# Patient Record
Sex: FEMALE | Race: WHITE
[De-identification: names, ages, dates, MRNs, and addresses within clinical notes are randomized per-mention and may not be internally consistent; named-entity substitution may affect disease eponyms.]

---

## 2017-11-10 NOTE — RAD
LEFT KNEE FOUR VIEWS:

 

HISTORY:

Left knee pain.

 

FINDINGS:

Degenerative changes are present, manifested by osteophyte formation and joint space narrowing in al
l three compartments of the joint space.  No fracture, dislocation, or bony destruction is seen.

 

IMPRESSION:

Left knee osteoarthritis.

 

POS: OFF

## 2017-11-10 NOTE — RAD
RIGHT KNEE 4 VIEWS:

 

HISTORY: 

Right knee pain.

 

FINDINGS/IMPRESSION:  

Degenerative changes are present, most prominent in the lateral tibial femoral compartment.  No acut
e fracture or dislocation is identified.  There is fullness in the suprapatellar space suggestive of
 a joint effusion.

 

POS: OFF

## 2018-06-30 NOTE — PDOC.EVN
Event Note





- Event Note


Event Note: 


Patient with generalized pruritis and rash appr one hour post start of  IV 

vancomycin infusion, no Shortness of breath VSS will d/c Vancomycin and 

consider cleocin as alernative noting multiple prior antibiotic reactions.

## 2018-07-01 NOTE — ULT
LEFT UPPER EXTREMITY VENOUS DUPLEX STUDY:

 

Date:  07/01/18

 

Ultrasound Doppler with spectral analysis and color Doppler performed on the veins of the left upper 
extremity. 

 

INDICATION:

Left upper extremity pain and edema. 

 

FINDINGS:

Internal jugular vein shows normal flow and compression. Left subclavian vein exhibits normal flow wi
th Doppler evaluation. Left axillary vein, basilic vein, cephalic vein, and brachial vein all show no
rmal flow and compression. Ulnar and radial veins below the elbow show normal flow and compression. 

 

In the subcutaneous tissues in the region of the left shoulder/axilla, there is a circumscribed round
ed hypoechoic mass-like structure measuring 1.0 x 1.5 cm, probably a lymph node. 

 

IMPRESSION: 

No evidence of left upper extremity venous thrombosis. 

 

POS: [facility]

## 2018-07-01 NOTE — ULT
BILATERAL LOWER EXTREMITY VENOUS DUPLEX STUDY:

 

Date:  07/01/18 

 

INDICATION:

Bilateral lower extremity pain and edema. 

 

FINDINGS:

Deep veins of both lower extremities evaluated with color Doppler spectral analysis, and compression.
 

 

Veins of both lower extremities show normal compression and blood flow. No evidence of deep venous th
rombosis. 

 

IMPRESSION: 

No evidence of deep venous thrombosis. 

 

 

POS: [facility]

## 2018-07-01 NOTE — HP-2
DATE OF ADMISSION:  06/30/2018

 

TIME:  2217 hours.

 

ATTENDING:  Mendoza Justin MD

 

CODE STATUS:  FULL.

 

PRIMARY CARE PHYSICIAN:  Gracia Caballero MD

 

HISTORIAN:  Patient and daughter.

 

CHIEF COMPLAINT:  Swelling in left arm.

 

HISTORY OF PRESENT ILLNESS:  This is a 78-year-old female who comes in with 
chief complaint of left arm swelling.  She stated that last Thursday, she fell 
and scraped her wrist on cement after a fall, since then she developed erythema 
and swelling that actually started near her elbow and has been spreading ever 
since then to her wrist and upper arms.  She endorsed pain and swelling with 
erythema.  She denies fever, chills, or sweats.  She has not tried any kind of 
medication for this.  She notes a history of recurrent arm cellulitis that was 
treated in the past with antibiotics and the cellulitis always happen in her 
left arm.  She is status post mastectomy with her left breast.

 

PAST MEDICAL HISTORY:

1.  Hypothyroidism secondary to thyroid malignancy, status post resection.

2.  Hypertension.

3.  Heart murmur.

4.  History of DVT and PE.

5.  Normocytic anemia.

6.  Breast cancer, status mastectomy.

7.  Anxiety.

8.  Depression.

 

PAST SURGICAL HISTORY:

1.  Thyroidectomy.

2.  Bilateral mastectomy.

3.  Appendectomy.

4.  Cholecystectomy.

5.  Hysterectomy.

6.  Left shoulder surgery.

7.  Left ankle surgery.

8.  Abdominal hernia repair.

 

FAMILY HISTORY:

1.  Family history of breast cancer.

2.  Hyperlipidemia.

3.  Hypertension.

 

SOCIAL HISTORY:  The patient is a former tobacco user with a 60-pack-year 
history, but has quit more than 10 years ago.  Denies alcohol use or drug use.  
She lives at home with her  and has 3 family members within 1 mile.

 

ALLERGIES:

1.  PHENAZOPYRIDINE.

2.  AMOXICILLIN.

3.  ASPIRIN.

4.  CEPHALEXIN.

5.  PENICILLIN.

6.  SULFA ANTIBIOTICS.

7.  PAPER TAPE.

 

MEDICATIONS:

1.  Sertraline 100 mg p.o. daily.

2.  Losartan 100 mg p.o. daily.

3.  Atorvastatin 20 mg p.o. daily.

4.  Hydrochlorothiazide 12.5 mg p.o. daily.

5.  Eliquis 5 mg p.o. b.i.d.

6.  Levothyroxine 137 mcg p.o. daily.

7.  Tramadol 50 mg q.8 p.r.n.

8.  Multivitamin 1 capsule p.o. daily.

 

REVIEW OF SYSTEMS:  

General:  Denies current fever or chills or night sweats.  

Eyes:  Denies new vision changes.  

ENT:  Denies rhinorrhea or sore throat.  

Respiratory:  Endorses some cough and shortness of breath at times, but not at 
this moment.  

Cardiovascular:  Denies chest pain, palpitation, but endorses a history of 
heart murmur.  

Gastrointestinal:  Denies nausea, vomiting, diarrhea at this time.  
Genitourinary:  Denies dysuria or discharge.  

Skin:  Endorses rash on left arm.  

Musculoskeletal:  Endorses osteoarthritis with back pain and knee pain.  

Neurologic:  Denies any new weakness or numbness.  

Psychiatric:  Endorses anxiety and depression.

 

PHYSICAL EXAMINATION:

VITAL SIGNS:  Blood pressure 104/65, pulse 75, respirations 16, temperature 99.7
, O2 95% on room air.

GENERAL:  Alert, oriented x3, not in acute distress, obese, and appropriately 
interactive.

EYES:  Conjunctivae within normal limits.  Sclerae nonicterus.

ENT:  Nasal mucosa within normal limits.  Oropharynx is moist.

NECK:  Supple, without lymphadenopathy.

CARDIOVASCULAR:  Regular rate and rhythm, 2/6 systolic murmur.

RESPIRATORY:  Normal effort without any retractions.  Clear to auscultation 
bilaterally.

SKIN:  Warm and dry.  Left arm has erythema spreading from the wrist to the 
upper arm.  There is mild pain on palpation and left arm is mildly swollen 
compared to right arm, there is no area of induration or obvious skin break.

ABDOMEN:  Soft, tender throughout.  Bowel sounds present and normoactive.

EXTREMITIES:  2+ pitting edema in lower extremities.  Left leg is obviously 
larger than right leg and there is mild pain to palpation of the left leg.

MUSCULOSKELETAL:  Muscle strength is 4/5 throughout.  Structures within normal 
limits.  Range of motion is diminished bilaterally..

NEUROLOGICAL:  No obvious focal deficits.  Sensation within normal limits.  
cranial nerves II through XII grossly intact.  GCS 15.

PSYCHIATRIC:  Appropriate.

 

LABORATORY DATA:

1.  WBC 10.2, hemoglobin 7.4, platelets 241,000, MCV 79.

2.  Sodium 139, potassium 4, chloride 108, bicarbonate 21, BUN 21, creatinine 
1.2, glucose 114.

3.  Lactic acid 1.4, AST 13, ALT 8, alkaline phosphatase 45.

 

ASSESSMENT AND PLAN:

1.  Cellulitis of left arm.  Consider possibility of embolus as patient has had 
a mastectomy in her left breast and she reportedly has recurrent issue of 
erythema and swelling in her left arm.  We will obtain Doppler to help rule out 
embolus as a possible reason for her recurrent arm swelling.  The patient has 
multiple allergies, so we will start initially on vancomycin and consider 
clindamycin as a secondary choice.  Cultures have been obtained and pending 
sensitivity, will narrow coverage.

2.  Acute kidney injury.  Creatinine is mildly elevated above baseline.  At 
this time, patient will be monitored and will encourage monitor with morning 
labs for kidney function and she will be encouraged to increase p.o. fluid 
intake; if it is inadequate, we will start IV fluids in addition.

3.  Normocytic anemia.  This is a chronic issue which the patient is not 
currently endorsing any symptoms of anemia.  She is seeing Dr. Melo for 
iron infusion and her next iron infusion is not due until next week.

4.  Deep vein thrombosis rule out.  Patient has previous history of deep vein 
thrombosis.  Her left leg is noticeably larger than right leg.  We will obtain 
a Doppler for her left leg.

5.  Hypothyroidism.  We will continue her levothyroxine.

6.  Hyperlipidemia.  Continue home atorvastatin.

7.  Hypertension.  We will continue losartan.

8.  Anxiety and depression.  We will continue patient's sertraline.

9.  Ppx: Lovenox for DVT prevention

 

DISPOSITION AND LENGTH OF HOSPITAL STAY:  The patient will be disposed to 
medical floor.  Length of hospital stay is anticipated to be at least 2 days.

 

This history and physical exam as well as management have been discussed with 
Dr. Justin who agrees.

 

ARIA

## 2018-07-01 NOTE — PDOC.FM
- Subjective


Subjective: 





No acute events overnight. Pt reports she is feeling better and that the pain 

and redness is slightly improved. 





- Objective


Vital Signs & Weight: 


 Vital Signs (12 hours)











  Temp Pulse Resp BP Pulse Ox


 


 07/01/18 08:13  97.7 F  66  18  109/67  99











Result Diagrams: 


 07/01/18 04:46





 07/01/18 04:46





<Brady Bonner - Last Filed: 07/01/18 10:06>





- Objective


Vital Signs & Weight: 


 Vital Signs (12 hours)











  Temp Pulse Pulse Resp BP BP Pulse Ox


 


 07/01/18 10:11  97.8 F   63  18  116/71   96


 


 07/01/18 08:13  97.7 F  66   18   109/67  99


 


 07/01/18 08:00  97.7 F  66   18   











I&O: 


 











 06/30/18 07/01/18 07/02/18





 06:59 06:59 06:59


 


Intake Total   180


 


Balance   180











Result Diagrams: 


 07/01/18 04:46





 07/01/18 04:46





<Mendoza Justin - Last Filed: 07/01/18 11:52>





Phys Exam





- Physical Examination


Constitutional: NAD


HEENT: PERRLA, sclera anicteric


Neck: no nodes, no JVD


Respiratory: no wheezing, no rales, no rhonchi, clear to auscultation bilateral


Cardiovascular: RRR, no rub


murmuer 2/6


Gastrointestinal: soft, non-tender, no distention, positive bowel sounds


Musculoskeletal: pulses present, edema present


LUE and LLE edema, chronic 


Neurological: non-focal, normal sensation, moves all 4 limbs


Skin: cap refill <2 seconds


Deviation from normal: erythematous LUE extending from inferior to elbow 

superiorly to shoulder





<Brady Bonner - Last Filed: 07/01/18 10:06>





Dx/Plan


(1) Cellulitis


Code(s): L03.90 - CELLULITIS, UNSPECIFIED   Status: Acute   





(2) Normocytic anemia


Code(s): D64.9 - ANEMIA, UNSPECIFIED   Status: Acute   





(3) HTN (hypertension)


Code(s): I10 - ESSENTIAL (PRIMARY) HYPERTENSION   Status: Acute   





(4) HLD (hyperlipidemia)


Code(s): E78.5 - HYPERLIPIDEMIA, UNSPECIFIED   Status: Acute   





(5) Anxiety


Code(s): F41.9 - ANXIETY DISORDER, UNSPECIFIED   Status: Acute   





(6) History of DVT (deep vein thrombosis)


Code(s): Z86.718 - PERSONAL HISTORY OF OTHER VENOUS THROMBOSIS AND EMBOLISM   

Status: Acute   





- Plan


Plan: 





1) cellulitis: cont IV abx for now, reports slight improvement form yesterday


- nurse to susie area of erythema


- monitor


- s/p b/l mastectomy with radical llymph node dissection likely componenet of 

lymphedema, recommend compression device when cellulitis improves





2) Normocytic anemia hgb of 6


- pt typed and crossed and will transfuse 1 U PRBC, rechec h/h after transfusion


- cont lovenox for now pending DVT/doppler studies and dc if negative for dvt


- pt follows hematology op and has been evaluated extensively


- was scheduled for iron infusion tomorrow w/ Dr Melo





3) HTN: home meds





4) HLD: home meds





5) Anxiety/depression: home meds





6) h/o DVT:


- dopplers pending of LLE and LUE


- DVT unlikely; awaiting doppler studies





Dispo: Pt likely stable for DC to home with PO clinda tomorrow if erythema 

improves








<Brady Bonner - Last Filed: 07/01/18 10:06>





Attending Addendum





- Attending Addendum


Date/Time: 07/01/18 1142





I personally evaluated the patient and discussed the management with Dr. Bonner


I agree with the History, Examination, Assessment and Plan documented above 

with any addition or exceptions noted below.Patient redness improved since 

admission . Note history of undetermined source of GI bleed extensively 

evaluated in the past  including capsule video endoscopy. Patient has been 

receiving weekly IV iron infusions. Note drop in H/H will administer blood 

products today and discontinue lovenox if DVT ruled out. Note with history of 

lumpectomy bilateral breast and left axillary lymph node dissection with 

lymphedema patient compression garment would be prudent however Patient has 

tried compression garments in the past but states she is sensitive to spandex 

and did not tolerate them.








<Mendoza Justin - Last Filed: 07/01/18 11:52>

## 2018-07-02 NOTE — CON
DATE OF CONSULTATION:  07/02/2018

 

REASON FOR CONSULTATION:  Anemia.

 

HISTORY OF PRESENT ILLNESS:  Ms. Coates is a pleasant 78-year-old  female with a history of
 deep venous thrombosis and pulmonary embolus on anticoagulation with Eliquis.  She has had chronic i
michael deficiency since starting Eliquis in 2015.  She receives IV iron transfusions in the outpatient s
etting.  She has had multiple episodes where she would have a drop in hemoglobin as low as 5.9.  She 
has been evaluated by GI with endoscopy in 2015 and underwent capsule endoscopy in 2017 and all negat
torito.  I felt that her anemia was likely from a chronic gastrointestinal bleed from anticoagulation.  
She has a sedentary lifestyle and rarely walks.  Decision was made by Dr. Jackson, Dr. Delong, and MARIANNA kendall to continue Eliquis due to her high risk for repeat DVT and pulmonary embolus.  She presented to
 the emergency room on this visit with left arm swelling and erythema.  She was diagnosed with cellul
itis and started on antibiotics with improvement in her cellulitis.  During this admission, her hemog
lobin has dropped to 6.  She has received 2 units of packed RBCs.  Her last iron infusion was last we
ek in our clinic.  On exam, she has no shortness of breath or fatigue.  She does complain of abdomina
l pain in the lower right quadrant.  She has a history of hysterectomy.  Denies any blood in her stoo
l or vaginal bleeding.

 

PAST MEDICAL HISTORY:

1.  Left breast cancer in 1990.

2.  Thyroid cancer in 1960.

3.  Hypertension.

4.  Hyperlipidemia.

5.  Peptic ulcer disease.

6.  Hypothyroidism.

7.  Osteoarthritis.

8.  Obesity.

9.  Provoked thrombosis and pulmonary embolism in 2015.

 

PAST SURGICAL HISTORY:

1.  Appendectomy.

2.  Partial thyroidectomy.

3.  Cholecystectomy.

4.  Ventral hernia repair.

5.  Partial mastectomy.

6.  Ankle and fracture repair.

 

ALLERGIES:  CODEINE, PAPER TAPE, PENICILLIN, SULFA.

 

HOME MEDICATIONS:

1.  Eliquis 5 mg b.i.d.

2.  Lipitor 20 mg daily.

3.  Hydrochlorothiazide 12.5 mg daily.

4.  Levothyroxine 135 mcg daily.

5.  Losartan 100 mg daily.

6.  Multivitamin daily.

7.  Sertraline 100 mg daily.

8.  Tramadol 50 mg p.r.n.

 

FAMILY HISTORY:  Diabetes.  Father had lung cancer.  Brother with pancreatic cancer, both as a child 
with vitamin D deficiency.

 

SOCIAL HISTORY:  , lives with her spouse, 4 grown children. No alcohol, tobacco or illicit castillo
g use.

 

REVIEW OF SYSTEMS:  Twelve-point review of systems is negative except for noted in HPI.

 

PHYSICAL EXAMINATION:

VITAL SIGNS:  Temperature 97.9, pulse is 61, respiratory rate 16, BP is 145/64, she is 97% on room ai
r.

GENERAL:  This is a morbidly obese female in no acute distress.

HEENT:  Normocephalic, atraumatic.  Pupils equal and reactive to light.

NECK:  Supple.

CARDIOVASCULAR:  Regular rate and rhythm.

LUNGS:  Clear.

ABDOMEN:  Obese, mildly tender to palpation in her right lower quadrant.

EXTREMITIES:  No clubbing.  She has a left upper arm swelling.

SKIN:  No rash.

HEMATOLOGIC:  No petechia or purpura.

NEUROLOGIC:  Nonfocal.

PSYCHIATRIC:  The patient is alert and oriented and appropriate.

 

PERTINENT LABORATORY AND IMAGING DATA:  Current WBCs are 7, hemoglobin 6.9, hematocrit 22.4, platelet
 count is 179,000, 68% neutrophils, 18% lymphocytes, 11% monocytes.  PT is 15.2, INR is 1.2, PTT 31.7
.  Sodium 142, potassium 4.0, chloride 111, CO2 is 24, BUN is 20, creatinine 0.81, calcium is 8.6, la
st ferritin was 9.1, total bilirubin is 0.4, AST is 9, ALT is 8, alkaline phosphatase is 38, serum to
lexis protein 5.6, albumin 3.4, globulin 2.2.  Urine was negative for bacteria.

 

ASSESSMENT:

1.  Acute on chronic anemia.

2.  Left upper extremity cellulitis.

3.  Right lower quadrant abdominal pain.

 

DISCUSSION:  The patient has had a negative GI workup including endoscopy in 2015 and capsule endosco
py in 2017.  She has remained on Eliquis due to her sedentary lifestyle and high risk for recurrent D
VT and PE.  Cedar is that this is chronic anemia from gastrointestinal bleeding.  She does have 
a new abdominal discomfort.  We will check CT scan to rule out any type of malignancy, any history of
 breast cancer, although she has received 2 units of packed RBCs.  No iron is needed at this time.  S
he may be a candidate for an IVC filter in order to stop anticoagulation as she has had multiple epis
odes where hemoglobin has been around 6.  We will discuss further with Dr. Melo.

 

Thank you for the consult.  We will follow her closely.

## 2018-07-02 NOTE — CT
CT ABDOMEN AND PELVIS WITH IV CONTRAST:

 

HISTORY: 

Breast cancer, abdominal pain, chronic UTI.

 

FINDINGS: 

Comparison is made with the exam of 4/27/15.

 

There are mild dependent changes in the lung bases.  Moderate-sized hiatal hernia is again seen.  The
 2.5 cm cyst in the left lobe of the liver is essentially stable.  The 2.5 cm right adrenal nodule an
d the 2.2 cm left adrenal nodule are stable, consistent with adenomas.  The spleen and pancreas are n
ormal.  Small low-density lesions in the kidneys likely cysts are seen bilaterally.

 

No free air, free fluid, or lymphadenopathy is noted in the abdomen or pelvis.

 

The patient is post cholecystectomy and appendectomy and hysterectomy.  There are vascular calcificat
ions without evidence of aneurysmal dilatation of the abdominal aorta.  There are degenerative change
s in the spine.  The small bowel loops are not abnormally dilated.  There is colonic diverticulosis w
ithout definite colon diverticulitis.  A small fat-containing ventral hernia is present.

 

IMPRESSION: 

1.  Colonic diverticulosis without evidence of diverticulitis.

 

2.  Bilateral adrenal adenomas.

 

3.  Moderate hiatal hernia.

 

4.  Stable liver and renal cysts.

 

POS: [facility]

## 2018-07-02 NOTE — PDOC.FM
- Subjective


Subjective: 


Pt reports feeling well after rest. Subjective fevers (chronic) no chills, no 

pain in L arm. No nausea/vommiting. Complains of itching on back of neck, 

reports that at home she resolves itching with hydrocortisone cream.





H/H- 6.9/22.4, Daughter reports that on 6/15, her hb was 7.7





ROS:


Gen: fevers, no chills


HEENT: no vision changes, no hearing changes


CARD: no CP, no palpitations


PULM: no SOB, no cough


GI: no N, no V


: no hematuria, no dysuria


Extr: mild swelling in L arm, no pain


Neuro: no syncope, no seizures





- Objective


MAR Reviewed: Yes


Vital Signs & Weight: 


 Vital Signs (12 hours)











  Temp Pulse Resp Pulse Ox


 


 07/01/18 20:00  98 F  66  20  96











I&O: 


 











 06/30/18 07/01/18 07/02/18





 06:59 06:59 06:59


 


Intake Total   540


 


Balance   540











Result Diagrams: 


 07/02/18 03:54





 07/02/18 03:54





<Kirill Ramirez - Last Filed: 07/02/18 11:31>





- Objective


Vital Signs & Weight: 


 Vital Signs (12 hours)











  Temp Pulse Resp BP BP Pulse Ox


 


 07/02/18 15:46  97.9 F  61  16  145/64 H   91 L


 


 07/02/18 08:00  97.6 F  60  20    97


 


 07/02/18 07:28  97.6 F  60  20   152/84 H  97











I&O: 


 











 07/01/18 07/02/18 07/03/18





 06:59 06:59 06:59


 


Intake Total  540 700


 


Balance  540 700











Result Diagrams: 


 07/02/18 03:54





 07/02/18 03:54





<Cha Canchola - Last Filed: 07/02/18 17:23>





Phys Exam





- Physical Examination


Constitutional: NAD


HEENT: moist MMs, sclera anicteric


Neck: full ROM


Respiratory: no wheezing, clear to auscultation bilateral


Cardiovascular: RRR, no significant murmur


Gastrointestinal: soft, non-tender, positive bowel sounds


Musculoskeletal: edema present (L upper extremity- mild non-pitting edema. 

hematoma on L hand from previous inury. Mild erythema on posterior L forearm 

measuring approximately 7ide4ny)


Psychiatric: normal affect





<Kirill Ramirez - Last Filed: 07/02/18 11:31>





Dx/Plan


(1) Anxiety


Code(s): F41.9 - ANXIETY DISORDER, UNSPECIFIED   Status: Acute   





(2) Cellulitis


Code(s): L03.90 - CELLULITIS, UNSPECIFIED   Status: Acute   


  QualifierTitle:    Site of cellulitis: extremity   Site of cellulitis of 

extremity: upper extremity   Laterality: left   Qualified Code(s): L03.114 - 

Cellulitis of left upper limb   





(3) HLD (hyperlipidemia)


Code(s): E78.5 - HYPERLIPIDEMIA, UNSPECIFIED   Status: Chronic   





(4) HTN (hypertension)


Code(s): I10 - ESSENTIAL (PRIMARY) HYPERTENSION   Status: Chronic   





(5) History of DVT (deep vein thrombosis)


Code(s): Z86.718 - PERSONAL HISTORY OF OTHER VENOUS THROMBOSIS AND EMBOLISM   

Status: Acute   





(6) Normocytic anemia


Code(s): D64.9 - ANEMIA, UNSPECIFIED   Status: Chronic   





(7) Anemia, iron deficiency


Code(s): D50.9 - IRON DEFICIENCY ANEMIA, UNSPECIFIED   Status: Chronic   





- Plan


Plan: 





1) cellulitis: cont IV abx, daughter reports great improvement over night


- monitor


- continue PO clindamycin





2) Normocytic anemia hgb of 6.9 (6 -> 7.1 -> 6.9 s/p 1 U PRBC)


- pt follows hematology op and has been evaluated extensively


- was scheduled for iron infusion today w/ Dr Melo


-consult Hematology, likely will give 1 U PRBC





3) HTN: home meds





4) HLD: home meds





5) Anxiety/depression: home meds





6) h/o DVT:


- dopplers of LLE and LUE negative





Dispo: Pt likely stable for DC to home with PO clinda tomorrow pending improved 

H/H








<Kirill Ramirez - Last Filed: 07/02/18 11:31>





Attending Addendum





- Attending Addendum


Date/Time: 07/02/18 9934





I personally evaluated the patient and discussed the management with Dr. Ramirez.


I agree with the History, Examination, Assessment and Plan documented above 

with any addition or exceptions noted below.


The patient's cellulitis is improving with clindamycin.  She remains anemic 

after 1st transfusion with hb < 7 this morning.  Will plan to transfuse today 

and consult heme.





<Cha Canchola - Last Filed: 07/02/18 17:23>

## 2018-07-03 NOTE — PDOC.FM
- Subjective


Subjective: 


No acute events overnight. Pt reports good rest and resolution of warmth in arm.





Ros:


Gen: no fever, no chills


CARD: no cp, no palpitations


Pulm: GUZMAN (chronic), no cough


GI: no N, no V


: no hematuria, no dysuria





- Objective


MAR Reviewed: Yes


Vital Signs & Weight: 


 Vital Signs (12 hours)











  Temp Pulse Resp BP Pulse Ox


 


 07/02/18 20:14  98.0 F  62  18  143/80 H  96


 


 07/02/18 20:00  98.0 F  62  18   96








 Weight











Weight                         131.27 kg














I&O: 


 











 07/01/18 07/02/18 07/03/18





 06:59 06:59 06:59


 


Intake Total  540 700


 


Balance  540 700











Result Diagrams: 


 07/03/18 04:29





 07/03/18 04:29





<Kirill Ramirez - Last Filed: 07/03/18 08:30>





- Objective


Vital Signs & Weight: 


 Vital Signs (12 hours)











  Temp Pulse Pulse Resp BP BP Pulse Ox


 


 07/03/18 09:14    69   129/77  


 


 07/03/18 08:00  98.1 F  58 L   20   123/78  97














  Pulse Ox


 


 07/03/18 09:14  95


 


 07/03/18 08:00 








 Weight











Weight                         131.27 kg














I&O: 


 











 07/02/18 07/03/18 07/04/18





 06:59 06:59 06:59


 


Intake Total 540 1140 600


 


Balance 540 1140 600











Result Diagrams: 


 07/03/18 04:29





 07/03/18 04:29





<Cha Canchola - Last Filed: 07/03/18 18:00>





Phys Exam





- Physical Examination


HEENT: moist MMs, sclera anicteric


Respiratory: no wheezing, clear to auscultation bilateral


Cardiovascular: RRR, no rub


Gastrointestinal: soft


tenderness to palpation- LLQ


Musculoskeletal: pulses present, edema present (mild edema on LUE, no erythema)


Psychiatric: normal affect


Skin: normal turgor, cap refill <2 seconds





<Kirill Ramirez - Last Filed: 07/03/18 08:30>





Dx/Plan


(1) Anxiety


Code(s): F41.9 - ANXIETY DISORDER, UNSPECIFIED   Status: Acute   





(2) Cellulitis


Code(s): L03.90 - CELLULITIS, UNSPECIFIED   Status: Acute   


  QualifierTitle:    Site of cellulitis: extremity   Site of cellulitis of 

extremity: upper extremity   Laterality: left   Qualified Code(s): L03.114 - 

Cellulitis of left upper limb   





(3) HLD (hyperlipidemia)


Code(s): E78.5 - HYPERLIPIDEMIA, UNSPECIFIED   Status: Chronic   





(4) HTN (hypertension)


Code(s): I10 - ESSENTIAL (PRIMARY) HYPERTENSION   Status: Chronic   





(5) History of DVT (deep vein thrombosis)


Code(s): Z86.718 - PERSONAL HISTORY OF OTHER VENOUS THROMBOSIS AND EMBOLISM   

Status: Acute   





(6) Normocytic anemia


Code(s): D64.9 - ANEMIA, UNSPECIFIED   Status: Chronic   





(7) Anemia, iron deficiency


Code(s): D50.9 - IRON DEFICIENCY ANEMIA, UNSPECIFIED   Status: Chronic   





- Plan


Plan: 





1) cellulitis: improved, no erythema, slight edema, no pain


- switch from IV to PO clindamycin (day 3 of 7)





2) Normocytic anemia hgb of 6.9 (7.1 -> 6.9 -> 7.7 s/p 2 U PRBC)


- pt follows hematology op and has been evaluated extensively


- Hematology: suspects GI slow bleed


- f/u with Heme outpatient





3) HTN: home meds





4) HLD: home meds





5) Anxiety/depression: home meds





6) h/o DVT:


- dopplers of LLE and LUE negative





Dispo: Discharge today with PO clindamycin





<Kirill Ramirez - Last Filed: 07/03/18 08:30>





Attending Addendum





- Attending Addendum


Date/Time: 07/03/18 2815





I personally evaluated the patient and discussed the management with Dr. Ramirez.


I agree with the History, Examination, Assessment and Plan documented above 

with any addition or exceptions noted below.


The patient's cellulitis continues to improve.  Will d/c on po clindamycin.  Hb 

improved after transfusion.  Will f/u with heme as outpt.





<Cha Canchola - Last Filed: 07/03/18 18:00>

## 2018-07-10 NOTE — DIS-2
DATE OF ADMISSION:  06/30/2018

 

DATE OF DISCHARGE:  07/03/2018

 

RESIDENT:  Kirill Ramirez M.D.

 

ADMITTING ATTENDING:  Dr. Mendoza Justin.

 

DISCHARGE ATTENDING:  Dr. Cha Canchola.

 

CONSULTATIONS:  Hematology with Laila Golden.

 

PROCEDURES:  Avascular ultrasound on 07/01/2018 which showed the left upper extremity, no evidence of
 left upper extremity venous thrombosis and another procedure venogram on 07/01/2018 which showed nikolay
ateral lower extremities, no evidence of deep venous thrombosis and an abdominal and pelvis CT on 07/
02/2018 which showed:

1.  Colonic diverticulosis without evidence of diverticulitis.

2.  Bilateral adrenal adenomas.

3.  Moderate hiatal hernia.

4.  Stable liver and renal cysts.

 

PRIMARY DIAGNOSES:  Left arm cellulitis and normocytic anemia likely secondary to gastrointestinal bl
eed.

 

SECONDARY DIAGNOSES:  Hyperlipidemia, hypertension, anxiety and depression, and acute kidney injury.

 

DISCHARGE MEDICATIONS:  Sertraline HCL 50 mg 2 tablets p.o. daily, losartan 100 mg p.o. daily, levoth
yroxine sodium 137 mcg p.o. daily, hydrochlorothiazide 12.5 mg p.o. daily, multivitamin 1 tab p.o. da
kip, atorvastatin, calcium 20 mg p.o. daily, apixaban 5 mg p.o. b.i.d., Tramadol HCL 50 mg p.o. q.8 h
ours p.r.n., clindamycin 300 mg p.o. q.6 hours.

 

DISCONTINUED MEDICATIONS:  Zofran 4 mg p.o. q.6 h. p.r.n. for nausea and vomiting.

 

HOSPITAL COURSE:  Eryn pinon is a 78-year-old female with medical history of bilateral mastecto
my with radical lymph node dissection, presenting to the hospital on 06/30/2018 with left arm swellin
g and was diagnosed with left arm cellulitis.  She was found to have normocytic anemia with hemoglobi
n of 7.4 and hematocrit of 24.7, cellulitis was treated with clindamycin and infection resolved.  Hem
oglobin and hematocrit on 07/01/2018 was found to be 6 and 20, patient received 2 units of packed red
 blood cells over the course of hospital stay and Hematology was consulted.  Patient was discharged a
fter marked improvement of left upper extremity cellulitis and hemoglobin and hematocrit were 7.7 and
 25.1 on discharge.  The patient is to follow up with primary care provider and Hematology, Dr. Lori marie.

 

DISPOSITION:  Stable.

 

DISCHARGE INSTRUCTIONS:  Discharged home with home health.

 

DIET:  Healthy heart diet.

 

ACTIVITY:  As tolerated.

FOLLOWUP:  With PCP within 1-2 weeks at Dr. Caballero and with Hematology/Oncology, Dr. Melo within
 1-2 weeks.

## 2018-09-12 NOTE — HP
PRIMARY CARE PHYSICIAN:  Dr. Samanta Crenshaw

 

CODE STATUS:  FULL CODE.

 

TIME OF EVALUATION:  12:40 a.m.

 

CHIEF COMPLAINT:  Rectal bleeding.

 

HISTORY OF PRESENT ILLNESS:  This is a 78-year-old female patient with past 
medical history of being bedbound, also history of multiple PEs and DVTs.  The 
patient has recurrent episode of bleeding in the past with dropping hemoglobin 
down to 5; however, the bleeding in the past was not evident and was suspected 
to be from a GI source.  The patient has been seeing Dr. Zhang as outpatient and 
there was a scheduled GI procedure, likely a colonoscopy for today 09/12/2018.  
The patient reported that yesterday she had a large amount of blood when she 
was trying to go to the bathroom, and the toilet has a significant amount of 
blood with some clots, she reported that she had hemorrhoids.  Some of them 
have gotten surgery in the past; however, for that reason she came to the ER.  
She was seen, she was evaluated and sent back home, but then she continued to 
have bleeding at home.  Of note, the patient has been on Eliquis for history of 
recurrent PEs.  This placed her at risk for significant bleeding.  For that 
reason, the patient has been placed in the hospital.  The patient did report 
having shortness of breath and feeling tired and for that reason we will give 
her 1 PRBC, we will keep the rest on hold, if patient has any severe bleeding, 
we will transfuse as needed.

 

REVIEW OF SYSTEMS:

CONSTITUTIONAL:  No fever or chills or generalized weakness.  

RESPIRATORY:  No cough, sputum production or shortness of breath.  

CARDIOVASCULAR:  No chest pain or palpitations.  

GASTROINTESTINAL:  The patient has nausea, vomiting.  The patient reported 
having a large amount of red blood per rectum.

CNS:  The patient reported dizziness.  No headache.  Not feeling lightheaded.  

GENITOURINARY:  No burning with urination.  

EXTREMITIES:  No leg swelling.  

 

All other systems were reviewed and are negative except for that mentioned 
above. 

 

PAST MEDICAL HISTORY:  Patient has a history of hyperlipidemia, breast cancer, 
hyperthyroidism, history of heart murmur, history of multiple DVTs and PEs, 
history of anemia, diverticulitis.

 

PAST SURGICAL HISTORY:  Thyroidectomy, bilateral mastectomy, appendectomy, 
cholecystectomy, hysterectomy, left shoulder and left ankle broken needing 
surgery, skin cancer removal.

 

PSYCHIATRIC HISTORY:  Anxiety, depression.



FAMILY HISTORY: Reviewed and non contributory for current presentation. 

 

SOCIAL HISTORY:  Lives at home with family.  The patient is a former tobacco 
user.  The patient quit smoking more than 10 years ago.  The patient denies 
alcohol, no drugs.

 

ALLERGIES:  AMOXICILLIN, CEPHALEXIN, NSAIDs, PENICILLIN, PHENAZOPYRIDINE, SULFA.

 

REPORTED MEDICATIONS:  Sertraline, losartan, atorvastatin, hydrochlorothiazide, 
Eliquis, levothyroxine, tramadol, multivitamin, Anusol-HC 1.

 

PHYSICAL EXAMINATION:

VITAL SIGNS:  On presentation, blood pressure 104/70 with heart rate 71, 
respiratory rate was 18, temperature 99.1, oxygen saturation 95 on room air.

GENERAL APPEARANCE:  The patient is alert, oriented, not in any acute distress.

HEENT:  Eyes; normal conjunctivae.  Moist oral mucosa.  Anicteric.

NECK:  No JVD.

RESPIRATORY:  Bilateral air entry.  No rales, no wheezing.  Symmetric expansion.

CARDIOVASCULAR:  Normal rate, regular rhythm.  The patient has systolic murmur 
in the aortic valve.  No gallop.  No edema.

ABDOMEN:  Soft, normal bowel sounds.

MUSCULOSKELETAL:  Baseline range of motion, normal strength, no tenderness.  
The patient is bedbound.

SKIN:  Warm and intact.  No pallor, no rash or redness.  Peripheral pulses are 
present.  Capillary refill seems to be intact.

NEUROLOGIC:  Intact sensory.  No evidence of any new focal weakness.  Baseline 
speech.  Cranial nerves seems to be intact.

PSYCHIATRIC:  The patient is good mood.  No anxiety.  Oriented, optimal 
judgment.  

 

EKG was discussed with the performing physician from the ER, the patient had 
normal sinus rhythm with the rate of 66 with a QT prolongation of 526.  LVH ____
.  The patient's finding seems to be different when compared with previous one.

 

LABORATORY DATA:  Labs were reviewed.  The patient had a white count of 11 with 
hemoglobin 7.9.  Repeat hemoglobin 7.3, on previous admission was 8.8, MCV 84, 
platelet count 229.  The chemistry on this patient was sodium 139 with 
potassium 4.2, chloride 109, carbon dioxide 23, anion gap 11, BUN 31, 
creatinine initial presentation was 1.2, the repeat 1.1, GFR 40 and 48, glucose 
112, calcium 9.0, magnesium 2.4.  LFTs are normal.

 

ASSESSMENT AND PLAN:  The patient will be placed in the hospital with the 
following medical problems:

1.  Acute gastrointestinal bleeding, seems to be lower, seems to be rectal, 
could be secondary to hemorrhoids, the patient had ____ compensation from 
bleeding since the patient is on Eliquis for a history of recurrent PEs and 
DVTs.  We will place the patient in IMCU for close monitoring.  If the patient 
continues to bleed, we will hydrate aggressively to give hemodynamics and will 
transfuse.  Last hemoglobin 7.3.  The patient is symptomatic with reported 
dizziness and severe generalized weakness.  We will transfuse 1 unit, we will 
repeat hemoglobin, we will monitor.  GI has been consulted and as noted the 
patient had a schedule GI procedure today and we will call Dr. Zhang.  We will 
follow recommendations.  Keep n.p.o. for now.

2.  Uncontrolled hypertension.  Systolic has been 149, will reconcile home meds
, we will not treat aggressively given risk for hypovolemic shock.

3.  Hyperlipidemia, low cholesterol diet is advised, reconcile home meds.

4.  Pulmonary embolus and deep venous thrombosis prophylaxis, the bleeding 
needs to be fixed first.  If cleared by GI we will restart anticoagulation.  
The last dose of Eliquis reportedly was 3 hours ago.

5.  History of diverticulitis, diverticular bleeding is another possibility.  
We will follow GI recommendations.

 

MTDD

## 2018-09-12 NOTE — PDOC.PN
- Subjective


Encounter Start Date: 09/12/18


Encounter Start Time: 11:30


Subjective: pt up in bed no complains





- Objective


Resuscitation Status: 


 











Resuscitation Status           FULL:Full Resuscitation














Vital Signs & Weight: 


 Vital Signs (12 hours)











  Temp Pulse Pulse Resp BP BP Pulse Ox


 


 09/12/18 12:40  97.6 F  62   20   136/38 L 


 


 09/12/18 08:29  98.1 F  60   18   125/37 L  96


 


 09/12/18 07:56  98.2 F   62  18  112/48 L   100


 


 09/12/18 07:50  98.1 F  60   18    100


 


 09/12/18 05:46  97.9 F   64  20  119/41 L  


 


 09/12/18 05:28  97 F L   67  20  97/43 L  


 


 09/12/18 04:24  97.6 F  64   19   129/56 L  95








 Weight











Weight                         270 lb 14.4 oz














I&O: 


 











 09/11/18 09/12/18 09/13/18





 06:59 06:59 06:59


 


Intake Total  0 350


 


Balance  0 350











Result Diagrams: 


 09/12/18 09:20





 09/12/18 03:14





Phys Exam





- Physical Examination


Neck: no nodes, no JVD, supple, full ROM


Respiratory: no wheezing, no rales, no rhonchi, wheezing present, clear to 

auscultation bilateral


Cardiovascular: RRR, no significant murmur, no rub, gallop, irregular





Dx/Plan


(1) GI bleed


Code(s): K92.2 - GASTROINTESTINAL HEMORRHAGE, UNSPECIFIED   Status: Acute   





(2) Anemia


Code(s): D64.9 - ANEMIA, UNSPECIFIED   Status: Acute   





(3) HTN (hypertension)


Code(s): I10 - ESSENTIAL (PRIMARY) HYPERTENSION   Status: Chronic   





(4) Pulmonary embolism


Code(s): I26.99 - OTHER PULMONARY EMBOLISM WITHOUT ACUTE COR PULMONALE   Status

: Acute   





- Plan


will continue to monitor 


-: s/p one unit prbc


-: surgery consulted 


-: will hold bp meds





* .








Review of Systems





- Review of Systems


Respiratory: negative: Cough, Dry, Shortness of Breath, Hemoptysis, SOB with 

Excertion, Pleuritic Pain, Sputum, Wheezing


Cardiovascular: negative: chest pain, palpitations, orthopnea, paroxysmal 

nocturnal dyspnea, edema, light headedness, other


Gastrointestinal: negative: Nausea, Vomiting, Abdominal Pain, Diarrhea, 

Constipation, Melena, Hematochezia, Other


Genitourinary: negative: Dysuria, Frequency, Incontinence, Hematuria, Retention

, Other





- Medications/Allergies


Allergies/Adverse Reactions: 


 Allergies











Allergy/AdvReac Type Severity Reaction Status Date / Time


 


phenazopyridine HCl Allergy Severe Rash Verified 09/12/18 02:47





[From Azo]     


 


amoxicillin Allergy  Nausea Verified 09/12/18 02:47


 


cephalexin monohydrate Allergy  Rash Verified 09/12/18 02:47





[From Keflex]     


 


Penicillins Allergy  Headache Verified 09/12/18 02:47


 


Sulfa (Sulfonamide Allergy  Rash Verified 09/12/18 02:47





Antibiotics)     


 


aspirin AdvReac   Verified 09/12/18 02:47


 


paper tape Allergy  Rash Uncoded 09/12/18 02:47











Medications: 


 Current Medications





Acetaminophen (Tylenol)  650 mg PO Q4H PRN


   PRN Reason: Headache/Fever or Pain


Atorvastatin Calcium (Lipitor)  20 mg PO DAILY [facility]


Hydrocortisone Acetate (Anusol-Hc)  25 mg TX BID PRN


   PRN Reason: Hemorrhoids


Levothyroxine Sodium (Synthroid)  112 mcg PO 0600 [facility]


Levothyroxine Sodium (Synthroid)  25 mcg PO 0600 [facility]


Multivitamins (Theragran)  1 tab PO DAILY [facility]


Ondansetron HCl (Zofran)  4 mg IVP Q6H PRN


   PRN Reason: Nausea/Vomiting


Pantoprazole Sodium (Protonix)  40 mg IVP DAILY [facility]


   Last Admin: 09/12/18 15:05 Dose:  40 mg


Sertraline HCl (Zoloft)  100 mg PO DAILY [facility]


Sodium Chloride (Flush - Normal Saline)  10 ml IVF PRN PRN


   PRN Reason: Saline Flush


Tramadol HCl (Ultram)  50 mg PO Q8HR PRN


   PRN Reason: .MODERATE Pain

## 2018-09-13 NOTE — PRG
DATE OF SERVICE:  09/13/2018

 

SUBJECTIVE:  Ms. Coates is without complaints today.  She has had no bleeding.  Dr. Zimmerman saw her an
d there are plans for a stapled hemorrhoidectomy.

 

OBJECTIVE:

VITAL SIGNS:  Temperature is 98, pulse 66, blood pressure 146/47.

ABDOMEN:  Soft.

 

LABORATORY DATA:  Hemoglobin is 8.5.

 

ASSESSMENT:

1.  Hemorrhoidal bleeding.  I do not think this is any relationship to her problems of chronic anemia
 over the past year.  She has really not had rectal bleeding before.  As noted in initial consultatio
n, she has been previously evaluated with EGD, colonoscopy, and capsule endoscopy of small bowel.

2.  Anemia, stable.

 

RECOMMENDATIONS:  I agree with surgical plans for hemorrhoidectomy.  We will follow from a distance. 
 If I can be of any further assistance, please do not hesitate to contact me.

## 2018-09-13 NOTE — PRG
DATE OF SERVICE:  09/13/2018

 

SUBJECTIVE:  The patient is doing well.  No further bleeding.  No pain.  

 

PHYSICAL EXAMINATION: 

GENERAL:  Morbidly obese.

VITAL SIGNS:  Temperature 98, pulse 56, blood pressure 148/48.  

 

She looks good.  No bleeding.  Her abdomen is soft, nondistended, nontender.  

 

H&H stable at 7.9 and 24.

 

PLAN:  Try and do a bowel prep, so that we can safely do a stapled hemorrhoidopexy in the morning.

## 2018-09-13 NOTE — PRG
DATE OF SERVICE:  09/13/2018

 

SUBJECTIVE:  The patient is doing well.  She has had no bleeding since last night since we put the Ge
lfoam in.  Her H and H are stable.  She is having no pain.

 

PHYSICAL EXAMINATION:

VITAL SIGNS:  Temperature 98.8, pulse 61, blood pressure is 132/40.

GENERAL:  She looks good.  She is in no distress.  Her color is okay.

ABDOMEN:  Soft, nondistended.

 

LABORATORY DATA:  Her H and H are 7.9 and 24.  She does have blood available.

 

PLAN:  To start her on clear liquids, try and give her a bowel prep today and then do hemorrhoidectom
y tomorrow.

## 2018-09-13 NOTE — PRG
DATE OF SERVICE:  09/13/2018

 

SERVICE:  Pulmonary Medicine.

 

INTERVAL HISTORY:  The patient is doing fine from a respiratory standpoint.  Denies any current chest
 pain, nausea, vomiting, fevers or chills.  She is breathing comfortably.  Her hemoglobins remained s
table overnight.  She did not receive any more units of blood.  Otherwise, there has been no interval
 change to her condition.  Abdominal discomfort is not present.  She has a very good appetite.

 

PHYSICAL EXAMINATION:

VITAL SIGNS:  Afebrile, pulse 56, blood pressure 148/48, respirations 17, saturation 96% on room air.


GENERAL:  The patient is awake and alert, in no apparent distress.

LUNGS:  Decent air entry.  There is no prolonged expiratory phase or wheezing present.

HEART:  Normal rate, regular.

ABDOMEN:  Soft, nontender, nondistended.  Bowel sounds are positive.

MUSCULOSKELETAL:  No cyanosis or clubbing.  No pitting in the bilateral lower extremities.

NEUROLOGIC:  Grossly nonfocal.

 

LABORATORY DATA:  Hemoglobin 7.9 and roughly stable.

 

ASSESSMENT:

1.  Acute blood loss anemia.

2.  Lower gastrointestinal bleed, suspecting hemorrhoidal bleed.

3.  History of deep venous thrombosis/pulmonary embolus, requiring blood thinners at some future time
.

 

DISCUSSION AND PLAN:  The patient is doing fine from a respiratory standpoint.  At this point, she ca
n be transitioned to the surgical unit.  We will get her out of bed and into a chair a couple times o
n a daily basis and have her work with physical therapy.  When she arrives on the floor, she will hav
e no further requirements for inpatient Pulmonary or Critical Care opinion and I will sign off.

## 2018-09-13 NOTE — PDOC.PN
- Subjective


Encounter Start Date: 09/13/18


Encounter Start Time: 11:30


Subjective: pt up in bed no complains





- Objective


Resuscitation Status: 


 











Resuscitation Status           FULL:Full Resuscitation














Vital Signs & Weight: 


 Vital Signs (12 hours)











  Temp Pulse Resp BP Pulse Ox


 


 09/13/18 11:46  97.8 F  56 L  16  146/47 H  96


 


 09/13/18 07:36  98.0 F  56 L  17  148/48 H  96


 


 09/13/18 07:25      100


 


 09/13/18 03:58  98.8 F  61  16  133/40 L  97








 Weight











Weight                         270 lb 14.4 oz














I&O: 


 











 09/12/18 09/13/18 09/14/18





 06:59 06:59 06:59


 


Intake Total 0 460 


 


Balance 0 460 











Result Diagrams: 


 09/13/18 12:55





 09/12/18 03:14





Phys Exam





- Physical Examination


Neck: no nodes, no JVD, supple, full ROM


Respiratory: no wheezing, no rales, no rhonchi, wheezing present, clear to 

auscultation bilateral


Cardiovascular: RRR, no significant murmur, no rub, gallop, irregular


Gastrointestinal: soft, non-tender, no distention, positive bowel sounds





Dx/Plan


(1) GI bleed


Code(s): K92.2 - GASTROINTESTINAL HEMORRHAGE, UNSPECIFIED   Status: Acute   





(2) Anemia


Code(s): D64.9 - ANEMIA, UNSPECIFIED   Status: Acute   





(3) HTN (hypertension)


Code(s): I10 - ESSENTIAL (PRIMARY) HYPERTENSION   Status: Chronic   





(4) Pulmonary embolism


Code(s): I26.99 - OTHER PULMONARY EMBOLISM WITHOUT ACUTE COR PULMONALE   Status

: Acute   





- Plan


pt's hh is stable


-: pt is drinking prep for hemorrhoidectomy in am


-: ok to be transferred to floor





* .








Review of Systems





- Review of Systems


Respiratory: negative: Cough, Dry, Shortness of Breath, Hemoptysis, SOB with 

Excertion, Pleuritic Pain, Sputum, Wheezing


Cardiovascular: negative: chest pain, palpitations, orthopnea, paroxysmal 

nocturnal dyspnea, edema, light headedness, other


Gastrointestinal: negative: Nausea, Vomiting, Abdominal Pain, Diarrhea, 

Constipation, Melena, Hematochezia, Other





- Medications/Allergies


Allergies/Adverse Reactions: 


 Allergies











Allergy/AdvReac Type Severity Reaction Status Date / Time


 


phenazopyridine HCl Allergy Severe Rash Verified 09/12/18 02:47





[From Azo]     


 


amoxicillin Allergy  Nausea Verified 09/12/18 02:47


 


cephalexin monohydrate Allergy  Rash Verified 09/12/18 02:47





[From Keflex]     


 


Penicillins Allergy  Headache Verified 09/12/18 02:47


 


Sulfa (Sulfonamide Allergy  Rash Verified 09/12/18 02:47





Antibiotics)     


 


aspirin AdvReac   Verified 09/12/18 02:47


 


paper tape Allergy  Rash Uncoded 09/12/18 02:47











Medications: 


 Current Medications





Acetaminophen (Tylenol)  650 mg PO Q4H PRN


   PRN Reason: Headache/Fever or Pain


Atorvastatin Calcium (Lipitor)  20 mg PO DAILY [facility]


   Last Admin: 09/13/18 09:34 Dose:  20 mg


Hydrocortisone Acetate (Anusol-Hc)  25 mg MD BID PRN


   PRN Reason: Hemorrhoids


Levothyroxine Sodium (Synthroid)  112 mcg PO 0600 [facility]


   Last Admin: 09/13/18 05:31 Dose:  Not Given


Levothyroxine Sodium (Synthroid)  25 mcg PO 0600 [facility]


   Last Admin: 09/13/18 05:31 Dose:  Not Given


Multivitamins (Theragran)  1 tab PO DAILY [facility]


   Last Admin: 09/13/18 09:34 Dose:  1 tab


Ondansetron HCl (Zofran)  4 mg IVP Q6H PRN


   PRN Reason: Nausea/Vomiting


Pantoprazole Sodium (Protonix)  40 mg IVP DAILY [facility]


   Last Admin: 09/13/18 09:34 Dose:  40 mg


Polyethylene Glycol/Electrolytes (Golytely)  4,000 ml PO NOW [facility]


   Stop: 09/13/18 16:00


   Last Admin: 09/13/18 09:34 Dose:  4,000 ml


Sertraline HCl (Zoloft)  100 mg PO DAILY [facility]


   Last Admin: 09/13/18 09:34 Dose:  100 mg


Sodium Chloride (Flush - Normal Saline)  10 ml IVF PRN PRN


   PRN Reason: Saline Flush


Tramadol HCl (Ultram)  50 mg PO Q8HR PRN


   PRN Reason: .MODERATE Pain


   Last Admin: 09/12/18 23:41 Dose:  50 mg

## 2018-09-14 NOTE — PDOC.PN
- Subjective


Encounter Start Date: 09/14/18


Encounter Start Time: 10:30


Subjective: pt up in bed no complains





- Objective


Resuscitation Status: 


 











Resuscitation Status           FULL:Full Resuscitation














Vital Signs & Weight: 


 Vital Signs (12 hours)











  Temp Pulse Resp BP Pulse Ox


 


 09/14/18 13:51      100


 


 09/14/18 11:47  97.8 F  58 L   151/74 H  99


 


 09/14/18 11:16  97.8 F  63  18  174/76 H  100


 


 09/14/18 10:47   60   167/71 H  97


 


 09/14/18 10:17  97.8 F  61  20  192/70 H  95


 


 09/14/18 04:00  98.8 F  58 L  18  149/46 H  95








 Weight











Weight                         266 lb 6.4 oz














I&O: 


 











 09/13/18 09/14/18 09/15/18





 06:59 06:59 06:59


 


Intake Total 460 160 


 


Balance 460 160 











Result Diagrams: 


 09/14/18 03:35





 09/12/18 03:14





Phys Exam





- Physical Examination


Neck: no nodes, no JVD, supple, full ROM


Respiratory: no wheezing, no rales, no rhonchi, wheezing present, clear to 

auscultation bilateral


Cardiovascular: RRR, no significant murmur, no rub, gallop, irregular


Gastrointestinal: soft, non-tender, no distention, positive bowel sounds


Musculoskeletal: no edema, pulses present, edema present





Dx/Plan


(1) GI bleed


Code(s): K92.2 - GASTROINTESTINAL HEMORRHAGE, UNSPECIFIED   Status: Acute   





(2) Anemia


Code(s): D64.9 - ANEMIA, UNSPECIFIED   Status: Acute   





(3) HTN (hypertension)


Code(s): I10 - ESSENTIAL (PRIMARY) HYPERTENSION   Status: Chronic   





(4) Pulmonary embolism


Code(s): I26.99 - OTHER PULMONARY EMBOLISM WITHOUT ACUTE COR PULMONALE   Status

: Acute   





(5) Hemorrhoids with complication


Code(s): K64.8 - OTHER HEMORRHOIDS   Status: Acute   





- Plan


s/p hemorrhoidectomy with an excision 


-: pt on pain meds


-: hh stable


-: will check cbc and bmp in am


-: s/p prbc transfusion





* .








Review of Systems





- Review of Systems


Respiratory: negative: Cough, Dry, Shortness of Breath, Hemoptysis, SOB with 

Excertion, Pleuritic Pain, Sputum, Wheezing


Cardiovascular: negative: chest pain, palpitations, orthopnea, paroxysmal 

nocturnal dyspnea, edema, light headedness, other


Gastrointestinal: negative: Nausea, Vomiting, Abdominal Pain, Diarrhea, 

Constipation, Melena, Hematochezia, Other





- Medications/Allergies


Allergies/Adverse Reactions: 


 Allergies











Allergy/AdvReac Type Severity Reaction Status Date / Time


 


phenazopyridine HCl Allergy Severe Rash Verified 09/12/18 02:47





[From Azo]     


 


amoxicillin Allergy  Nausea Verified 09/12/18 02:47


 


cephalexin monohydrate Allergy  Rash Verified 09/12/18 02:47





[From Keflex]     


 


Penicillins Allergy  Headache Verified 09/12/18 02:47


 


Sulfa (Sulfonamide Allergy  Rash Verified 09/12/18 02:47





Antibiotics)     


 


aspirin AdvReac   Verified 09/12/18 02:47


 


paper tape Allergy  Rash Uncoded 09/12/18 02:47











Medications: 


 Current Medications





Acetaminophen (Tylenol)  650 mg PO Q4H PRN


   PRN Reason: Headache/Fever


Hydrocodone Bitart/Acetaminophen (Norco 10/325)  1 tab PO Q4H PRN


   PRN Reason: Mild-Moderate Pain (1-5)


Hydrocodone Bitart/Acetaminophen (Norco 10/325)  2 tab PO Q4H PRN


   PRN Reason: Moderate to Severe Pain (6-10)


Atorvastatin Calcium (Lipitor)  20 mg PO DAILY [facility]


   Last Admin: 09/14/18 09:00 Dose:  Not Given


Docusate Sodium (Colace)  100 mg PO BID [facility]


   Last Admin: 09/14/18 09:00 Dose:  Not Given


Doxycycline Hyclate (Vibramycin)  100 mg PO BID [facility]


   Stop: 09/19/18 09:00


   Last Admin: 09/14/18 09:00 Dose:  Not Given


Hydrocortisone Acetate (Anusol-Hc)  25 mg [place] BID PRN


   PRN Reason: Hemorrhoids


Levothyroxine Sodium (Synthroid)  112 mcg PO 0600 [facility]


   Last Admin: 09/14/18 05:55 Dose:  112 mcg


Levothyroxine Sodium (Synthroid)  25 mcg PO 0600 [facility]


   Last Admin: 09/14/18 05:55 Dose:  25 mcg


Morphine Sulfate (Morphine)  2 mg SLOW IVP Q4H PRN


   PRN Reason: Mild-Mod BREAKTHRU PAIN OR NPO


Morphine Sulfate (Morphine)  4 mg SLOW IVP Q4H PRN


   PRN Reason: Mod-Sev breakthrU pain or NPO


Multivitamins (Theragran)  1 tab PO DAILY [facility]


   Last Admin: 09/14/18 09:00 Dose:  Not Given


Ondansetron HCl (Zofran)  4 mg IVP Q6H PRN


   PRN Reason: Nausea/Vomiting


Pantoprazole Sodium (Protonix)  40 mg IVP DAILY [facility]


   Last Admin: 09/14/18 09:00 Dose:  Not Given


Sertraline HCl (Zoloft)  100 mg PO DAILY [facility]


   Last Admin: 09/14/18 09:00 Dose:  Not Given


Sodium Chloride (Flush - Normal Saline)  10 ml IVF PRN PRN


   PRN Reason: Saline Flush

## 2018-09-14 NOTE — PRG
DATE OF SERVICE:  09/14/2018

 

SERVICE:  Pulmonary Medicine.

 

INTERVAL HISTORY:  The patient is doing fine from cardiovascular and respiratory standpoint.  She is 
breathing comfortably.  She went down for her stapling procedure.  Apparently, some small ulcers were
 also identified.  Otherwise, the patient remains in her usual state of health.  There were no signif
icant overnight events.

 

PHYSICAL EXAMINATION:

VITAL SIGNS:  Afebrile, pulse 58, blood pressure 149/46, respirations 18, saturation 95% on room air.


GENERAL:  The patient is awake, alert, no apparent distress.

LUNGS:  Decent air entry.  There is no prolonged expiratory phase or wheezing present.

HEART:  Normal rate, regular.

ABDOMEN:  Soft, nontender, nondistended.  Bowel sounds are positive.

MUSCULOSKELETAL:  No cyanosis or clubbing.  There is no pitting in the bilateral lower extremities.

NEUROLOGIC:  Grossly nonfocal.

 

LABORATORY DATA:  Hemoglobin 8.0.

 

ASSESSMENT:

1.  Acute blood loss anemia.

2.  Lower gastrointestinal bleed secondary to suspected hemorrhoids, status post hemorrhoidectomy.

3.  History of deep venous thrombosis/pulmonary embolus.

 

DISCUSSION AND PLAN:  The patient is stable for transition to the floor.  Once cleared by surgery, we
 will need to restart her anticoagulation.  At this point, she has no further requirements for inpati
ent Pulmonary or Critical Care opinion and I will sign off.  Please call with additional questions or
 concerns moving forward.

## 2018-09-14 NOTE — OP
DATE OF PROCEDURE:  09/14/2018

 

PREOPERATIVE DIAGNOSIS:  Bleeding grade 4 hemorrhoids.

 

SURGEON:  Bradley Zimmerman M.D.

 

PROCEDURE PERFORMED:  PPH staple hemorrhoidectomy with an excision of a thrombosed right anterior com
plex.

 

INDICATIONS:  This is a 78-year-old female for years now has been dealing with anemia and rectal blee
ding.  She has had several colonoscopies that showed no source other than hemorrhoids.  She came in w
ith a severe hemorrhoidal bleed requiring blood transfusion.

 

FINDINGS:  She had chronically prolapsing grade 4 hemorrhoids, primarily a thrombosed external hemorr
hoid on the right anterior complex with an ulceration.

 

PROCEDURE IN DETAIL:  After informed consent was obtained, the patient was taken to the operating prem
m and given general endotracheal anesthesia.  She was placed in the prone jackknife position.  Her bu
ttock cheeks were spread apart with tape.  First of all a bivalve anal retractor was inserted and the
 rectum was inspected.  Most of the hemorrhoids were a combined hemorrhoids and there was a fairly ri
gid thrombosis of the right anterior complex with an ulceration that was actively bleeding.  A 4 quad
rant anal block was performed utilizing Marcaine.  Then the  PPH clear retractor was inserted and sut
ured in place with interrupted 0 silk suture.  Then the suture guide was inserted and a pursestring o
f 2-0 Prolene was placed circumferentially in the rectal mucosa.  The stapler was opened maximally.  
The anvil advanced beyond the pursestring.  The sutures were brought through the channels on the stap
ler utilizing the dayana hook, tied as a knot and held closed over the shaft of the stapler.  The st
apler was then closed maximally and then fired and held for 30 seconds, then released for 30 seconds,
 then removed.  There was 1 area of bleeding anterior that was controlled with a figure-of-eight of 3
-0 chromic.  The area was irrigated.  Then the retractor was removed.  There was still a little bit o
f venous ooze from this opening on the right anterior thrombosed complex so this was removed utilizin
g the Harmonic scalpel.  Hemostasis was achieved.  Gelfoam impregnated with bacitracin was placed wit
hin the anal canal.  Sterile bandage applied.  The patient tolerated the procedure well and was trans
ferred to recovery in good condition.

## 2018-09-15 NOTE — PRG
DATE OF SERVICE:  09/15/2018

 

SUBJECTIVE:  Ms. Coates is doing well.  She has been out of bed yet since surgery.  She is toleratin
g the liquid diet without difficulty.  No bowel movement since surgery, but not passing blood, having
 minimal pain, afebrile.  Vital signs are stable.

 

ASSESSMENT:  Postop day #1 stapled hemorrhoidectomy by Dr. Zimmerman.

 

PLAN:  Dr. Zimmerman recommended full liquids for 48 hours.  Likely could be discharged home tomorrow fro
m a surgery standpoint, she would just need to follow up with Dr. Zimmerman in a few weeks.

## 2018-09-15 NOTE — PDOC.PN
- Subjective


Encounter Start Date: 09/15/18


Encounter Start Time: 09:00





Pt seen for followup re:  GI bleed.  Feesl better.  Denies chest pain, 

shortness of breath, fevers or chills.  c/o sore throat from intubation.





- Objective


Resuscitation Status: 


 











Resuscitation Status           FULL:Full Resuscitation














MAR Reviewed: Yes


Vital Signs & Weight: 


 Vital Signs (12 hours)











  Temp Pulse Resp BP Pulse Ox


 


 09/15/18 12:00  97.8 F  69  16  130/54 L  99


 


 09/15/18 08:10  98.5 F  63  18  118/66  100


 


 09/15/18 08:00      100


 


 09/15/18 04:00  97.7 F  60  16  122/79  95








 Weight











Weight                         266 lb 6.4 oz














I&O: 


 











 09/14/18 09/15/18 09/16/18





 06:59 06:59 06:59


 


Intake Total 160  730


 


Balance 160  730











Result Diagrams: 


 09/15/18 04:54





 09/15/18 04:54


Additional Labs: 





Labs reviewed by me





Phys Exam





- Physical Examination


Constitutional: NAD


HEENT: moist MMs, oral pharynx no lesions


Neck: supple


Respiratory: clear to auscultation bilateral


Cardiovascular: RRR


Neurological: moves all 4 limbs


Psychiatric: normal affect





Dx/Plan


(1) GI bleed


Code(s): K92.2 - GASTROINTESTINAL HEMORRHAGE, UNSPECIFIED   Status: Acute   

Comment: secondary to hemorrhoids   





(2) Hemorrhoids with complication


Code(s): K64.8 - OTHER HEMORRHOIDS   Status: Chronic   Comment: s/p 

hemorrhoidectomy   





(3) HLD (hyperlipidemia)


Code(s): E78.5 - HYPERLIPIDEMIA, UNSPECIFIED   Status: Chronic   Comment: 

continue statin   





(4) HTN (hypertension)


Code(s): I10 - ESSENTIAL (PRIMARY) HYPERTENSION   Status: Chronic   Comment: 

controlled   





- Plan





* .








Review of Systems





- Review of Systems


ENT: Throat Pain


Cardiovascular: negative: chest pain, palpitations, orthopnea, paroxysmal 

nocturnal dyspnea, edema, light headedness


Gastrointestinal: negative: Nausea, Vomiting, Abdominal Pain, Diarrhea, 

Constipation, Melena, Hematochezia





- Medications/Allergies


Allergies/Adverse Reactions: 


 Allergies











Allergy/AdvReac Type Severity Reaction Status Date / Time


 


phenazopyridine HCl Allergy Severe Rash Verified 09/12/18 02:47





[From Azo]     


 


amoxicillin Allergy  Nausea Verified 09/12/18 02:47


 


cephalexin monohydrate Allergy  Rash Verified 09/12/18 02:47





[From Keflex]     


 


Penicillins Allergy  Headache Verified 09/12/18 02:47


 


Sulfa (Sulfonamide Allergy  Rash Verified 09/12/18 02:47





Antibiotics)     


 


aspirin AdvReac   Verified 09/12/18 02:47


 


paper tape Allergy  Rash Uncoded 09/12/18 02:47











Medications: 


 Current Medications





Acetaminophen (Tylenol)  650 mg PO Q4H PRN


   PRN Reason: Headache/Fever


Hydrocodone Bitart/Acetaminophen (Norco 10/325)  1 tab PO Q4H PRN


   PRN Reason: Mild-Moderate Pain (1-5)


Hydrocodone Bitart/Acetaminophen (Norco 10/325)  2 tab PO Q4H PRN


   PRN Reason: Moderate to Severe Pain (6-10)


   Last Admin: 09/15/18 07:43 Dose:  2 tab


Atorvastatin Calcium (Lipitor)  20 mg [facility]ILY [facility]


   Last Admin: 09/15/18 09:08 Dose:  20 mg


Docusate Sodium (Colace)  100 mg PO BID [facility]


   Last Admin: 09/15/18 09:08 Dose:  100 mg


Doxycycline Hyclate (Vibramycin)  100 mg PO BID [facility]


   Stop: 09/19/18 09:00


   Last Admin: 09/15/18 09:15 Dose:  100 mg


Hydrochlorothiazide (Hydrochlorothiazide)  12.5 mg PO DAILY [facility]


   Last Admin: 09/15/18 09:08 Dose:  12.5 mg


Hydrocortisone Acetate (Anusol-Hc)  25 mg [place] BID PRN


   PRN Reason: Hemorrhoids


Levothyroxine Sodium (Synthroid)  112 mcg PO 0600 [facility]


   Last Admin: 09/15/18 06:09 Dose:  112 mcg


Levothyroxine Sodium (Synthroid)  25 mcg PO 0600 [facility]


   Last Admin: 09/15/18 06:09 Dose:  25 mcg


Losartan Potassium (Cozaar)  100 mg PO DAILY [facility]


   Last Admin: 09/15/18 09:09 Dose:  100 mg


Morphine Sulfate (Morphine)  2 mg SLOW IVP Q4H PRN


   PRN Reason: Mild-Mod BREAKTHRU PAIN OR NPO


Morphine Sulfate (Morphine)  4 mg SLOW IVP Q4H PRN


   PRN Reason: Mod-Sev breakthrU pain or NPO


Multivitamins (Theragran)  1 tab PO DAILY [facility]


   Last Admin: 09/15/18 09:09 Dose:  1 tab


Ondansetron HCl (Zofran)  4 mg IVP Q6H PRN


   PRN Reason: Nausea/Vomiting


Pantoprazole Sodium (Protonix)  40 mg IVP DAILY [facility]


   Last Admin: 09/15/18 09:09 Dose:  40 mg


Sertraline HCl (Zoloft)  100 mg PO DAILY [facility]


   Last Admin: 09/15/18 09:09 Dose:  100 mg


Sodium Chloride (Flush - Normal Saline)  10 ml IVF PRN PRN


   PRN Reason: Saline Flush


Throat Lozenges (Cepastat Lozenges)  1 ruperto PO Q2H PRN


   PRN Reason: Sore Throat

## 2018-09-16 NOTE — DIS
PRIMARY CARE PROVIDER:  Samanta Crenshaw M.D.

 

DATE OF ADMISSION:  09/12/2018

 

DATE OF DISCHARGE:  09/16/2018

 

DISCHARGE DIAGNOSES:

1.  Gastrointestinal bleed.

2.  Hemorrhoids.

3.  Anemia of acute blood loss.

4.  Symptomatic anemia.

5.  Cellulitis.

6.  Acute kidney injury.

 

CONDITION OF PATIENT ON THE DAY OF DISCHARGE:  Stable.  I assessed Ms. Coates 
on the day of discharge.  She denies any chest pain or shortness of breath.  
Vital signs are stable.  S1 and S2 are heard, regular.  Lungs are clear to 
auscultation bilaterally.



CONSULTATIONS DURING THIS HOSPITALIZATION: GI Dr. Jackson, General Surgery Dr. Zimmerman and Pulmonology Dr. Rueda

 

DISCHARGE MEDICATIONS:  She has been advised to resume apixaban at the time of 
discharge and to contact her primary care provider if there are further 
episodes of bleeding.  Her discharge medications also include tramadol 50 mg 
every 8 hours as needed, Lipitor 20 mg daily, hydrochlorothiazide 12.5 mg daily
, levothyroxine 137 mcg daily, losartan 100 mg daily, multivitamins 1 tablet 
daily, sertraline 100 mg daily, and clindamycin 300 mg 4 times a day for 1 more 
week

 

HOSPITAL COURSE:  Ms. Coates is a pleasant 78-year-old lady, who was admitted 
to [facility] on 09/12/2018 for symptomatic anemia 
secondary to acute blood loss from lower gastrointestinal bleed.  She was seen 
by Pulmonary Critical Care Medicine, Gastroenterology and General Surgery 
Services during this hospitalization.  She received one unit packed RBC 
transfusion.  Please refer to Dr. Teresa Flores's history and physical note 
dated 09/12/2018 for further details.

 

On 09/14/2018, she underwent PPH stapled hemorrhoidectomy with excision of the 
thrombosed right anterior complex.  At the time of this dictation, pathology 
report is pending and she is advised to follow up with her primary care 
provider for the same.

 

Her hemoglobin improved to 9.2 on the day of discharge, up from 7.9 at the time 
of admission.

 

On 09/15/2018, she developed cellulitis over her left upper arm.  She was 
started on intravenous clindamycin, subsequently stepped down to oral 
clindamycin on the day of discharge.  Please note she also had acute renal 
insufficiency with a creatinine of 1.29 on 09/11/2018.  It normalized to 0.83 
on the day of discharge.

 

On the day of discharge, she has normal sodium, normal potassium, creatinine 
0.83, white count 10,500, hemoglobin 9.2, and platelet count 223,000.

 

Many thanks for allowing me to participate in your patient's care.  Please feel 
free to contact me with any questions or concerns.

 

DISCHARGE DESTINATION:  Home.

 

TOTAL AMOUNT OF TIME SPENT COORDINATING THIS DISCHARGE:  32 minutes.

 

ARIA

## 2018-09-22 NOTE — EKG
Test Reason : 

Blood Pressure : ***/*** mmHG

Vent. Rate : 066 BPM     Atrial Rate : 066 BPM

   P-R Int : 170 ms          QRS Dur : 118 ms

    QT Int : 502 ms       P-R-T Axes : -16 -05 107 degrees

   QTc Int : 526 ms

 

Sinus rhythm with occasional Premature ventricular complexes and Fusion complexes

Left ventricular hypertrophy with QRS widening and repolarization abnormality

Prolonged QT

Abnormal ECG

 

Confirmed by JARON HUGHES DO ([id number]),  XUAN LAURENT ([id number]) on 9/22/2018 5:56:04 PM

 

Referred By:             Confirmed By:JARON HUGHES DO

## 2018-09-24 NOTE — ULT
ULTRASOUND WITH DOPPLER DUPLEX VENOUS LOWER EXTREMITY LEFT:

 

09/24/2018

 

HISTORY:

A 78-year-old female with left lower extremity swelling and erythema.

 

TECHNIQUE:

Color flow Doppler, spectral waveform analysis of pulsed Doppler, and gray-scale imaging with charles
joselin and augmentation, were used to evaluate the left common femoral, femoral, popliteal, posterior t
ibial, and superficial femoral, veins; and the proximal portions of the profunda femoral and greater 
saphenous, veins.

 

FINDINGS:

The left popliteal vein does have blood flow but is expanded and is not compressible, filled with antonette
t.  The same is true for the posterior tibial vein.

 

A small amount of intermediate echogenicity material involving a small portion of the cross-sectional
 area of the proximal portion of the femoral vein, may or may not represent a small thrombus.  The fe
moral vein is compressible and has flow.  There is no clot in the common femoral vein, greater saphen
ous vein, or profunda femoral vein.

 

IMPRESSION:

Positive for acute deep venous thrombosis of the left popliteal vein and the left posterior tibial ve
in.

 

EILEEN KUMAR

 

POS: [facility]

## 2018-09-25 NOTE — CON
DATE OF CONSULTATION:  09/25/2018

 

REASON FOR CONSULTATION:  DVT.

 

HISTORY OF PRESENT ILLNESS:  A 78-year-old female with past medical history of breast cancer, thyroid
 cancer, DVTs and PEs, currently on Eliquis, presenting with lower extremity edema, erythema, and karmen
n.  Patient is a poor historian; however, her daughter is at bedside to help with history.  Patient s
tates she went for regular followup with her PCP and patient's daughter pointed out the leg to the 
ysician who sent her to the ER for evaluation.  Lower extremity Doppler showed an acute DVT of the le
ft popliteal vein and left posterior tibial vein.  Patient is unsure if she was taking her Eliquis at
 home.  The daughter believes that she was not.  The patient was supposedly taking 2.5 mg b.i.d.  The
 patient was recently admitted to the hospital for hemorrhoid surgery and has been immobile since the
n.  Patient is very immobile at baseline and can use a walker at home, but most of the time is in a w
heelchair or in bed.  Patient and daughter also state that her left upper extremity is more swollen t
welch normal; however, this is an arm that has undergone axillary lymph node dissection for a breast ca
ncer diagnosed in the 1990s.  She does complain of pain in this arm as well.  The patient has had dif
ficulty with anticoagulation leading to ongoing bleeding requiring IV iron and blood transfusions.  H
er Eliquis was titrated down to the current dose due to this.  Daughter and the patient state that th
e bleeding has improved and think it was mostly due to the hemorrhoids.

 

REVIEW OF SYSTEMS:  Ten-point review of systems is negative except as per HPI.

 

PAST MEDICAL HISTORY:  Breast cancer, thyroid cancer, DVT, PE, hyperlipidemia, and high blood pressur
e.

 

PAST SURGICAL HISTORY:  Mastectomy, thyroidectomy, appendectomy, cholecystectomy, left shoulder and l
eft ankle surgery, hysterectomy.

 

SOCIAL HISTORY:  Former tobacco smoker.

 

ALLERGIES:  AMOXICILLIN, KEFLEX, NSAIDS, PENICILLIN, PHENAZOPYRIDINE, SULFA.

 

CURRENT MEDICATIONS:  Reviewed.

 

PHYSICAL EXAMINATION:

VITAL SIGNS:  Temperature 97.8, blood pressure 151/98, pulse 64, respiration rate 16, O2 sat 99.

GENERAL:  Obese patient lying in bed.

NECK:  Supple.  No lymphadenopathy.

LUNGS:  Clear to auscultation bilaterally.

CARDIOVASCULAR:  Normal S1, S2 with regular rate and rhythm.  No murmurs, rubs or gallops.

ABDOMEN:  Obese, soft, nondistended, nontender.

EXTREMITIES:  Left upper extremity swelling greater than right with mild tenderness, but no erythema 
appreciated.  Bilateral lower extremity swelling, left greater than right with left erythema and tend
erness.

NEUROLOGIC:  Nonfocal.  Cranial nerves II-XII grossly intact.

PSYCHIATRIC:  Alert, oriented x3; however, is a poor historian and admits to not being able to rememb
er things.

 

LABORATORY DATA:  White blood cells 6.5, hemoglobin 7.5, platelets 255.  PT 14.5, INR 1.1, BUN 16, cr
eatinine 0.82.

 

IMAGING DATA:  Left lower extremity Doppler dated 09/24/2018, positive for acute deep venous thrombos
is of the left popliteal vein and left posterior tibial vein.

 

ASSESSMENT AND PLAN:  A 78-year-old female with history of multiple DVTs, PEs, currently on Eliquis w
ith history of noncompliance, presenting with new left lower extremity DVT.  As per daughter, it is u
nclear if the patient has been compliant with her Eliquis and the patient admits to not knowing if sh
e has been taking it.  She is very immobile at baseline which has increased since a recent hemorrhoid
 surgery in the last few weeks.  She currently denies any bleeding and thinks that this is improved s
rebecca her hemorrhoid surgery.  Since admission, her Eliquis has been held and she has been started on 
Lovenox with the plan to transition to warfarin.  I would recommend restarting the patient's Eliquis 
at 2.5 mg b.i.d. and discharging her on this medication.  Due to the patient's compliance issues and 
poor memory, I believe warfarin would be very difficult for her to be compliant with.  The daughter a
grees with this plan and admits that she is going to help monitor the patient's adherence to medicati
ons.  The patient may follow up as scheduled at [facility] after discharge.

## 2018-09-25 NOTE — ULT
ULTRASOUND DOPPLER DUPLEX VENOUS LEFT UPPER EXTREMITY

9/25/18 at 10:36 p.m.

 

HISTORY: 

78-year-old female with left upper extremity and erythema. 

 

TECHNIQUE:  

Gray scale, color flow and spectral analysis, of major veins of left upper extremity. Compression dale
lied to all veins except the subclavian.

 

FINDINGS:  

There is edema in the soft tissues of the left upper extremity, especially the forearm. The left inte
rnal jugular, subclavian, axillary, basilic, cephalic, brachial, ulnar and radial, veins, are patent,
 with no evidence of thrombosis. 

 

IMPRESSION:  

1.      No venous thrombosis of the left upper extremity. 

2.      Soft tissue edema of the left forearm.

 

POS: [facility]

## 2018-09-25 NOTE — PRG
DATE OF SERVICE:  09/25/2018

 

SUBJECTIVE:  The patient is a little groggy and slightly confused this morning.  Her daughter is pres
ent in the room with her.  She has no specific complaints.  She did have a large bowel movement.  She
 is taking Colace after her hemorrhoid surgery.

 

OBJECTIVE:

VITAL SIGNS:  T-max 98.1, pulse 59, respirations 18, O2 sat 95% on room air, /74.

GENERAL:  Age appropriate female in no distress.  She is obese.  She is awake, slightly confused, but
 pleasant and appropriate.

HEENT:  PERRL.  No OP lesions.

HEART:  Regular rate and rhythm.  No murmurs.

LUNGS:  Clear bilaterally.

ABDOMEN:  Soft, nontender, nondistended.

EXTREMITIES:  Warm and dry.  There is some edema at the left ankle area.  There is some edema in the 
left upper extremity was slight warmness and extremely faint erythema.  

 

LABORATORY:  Hemoglobin 7.5.  Sodium 141, potassium 3.9, chloride 108, BUN 16, creatinine 0.82.

 

ASSESSMENT:

1.  Deep vein thrombosis, left lower extremity.  The patient had a prior left lower extremity DVT fol
lowing a hernia repair.  That was a couple years ago.  At this time, the patient underwent a hemorrho
id surgery and was off of her Eliquis for a week.  Her family is not sure that she was taking it as s
he was supposed to even when she was not officially off of it.  I am not sure this would really repre
sent a true failure of the Eliquis as much as it is simply noncompliance and needing to hold it for t
he surgery.  She is currently on Lovenox.  Consulting Oncology as they followed the patient for this 
in the past.  Suspect she can resume the low dose Eliquis.

2.  Left upper extremity cellulitis.  It is unclear if this simply represents some chronic lymphedema
, a deep venous thrombosis or a true infectious cellulitis.  She has received vancomycin.  We will ge
[employer] Doppler to check that out.

3.  Anemia.  The patient's daughter reports the patient is chronically suffered from anemia since she
 has been on the Eliquis.  She has had a significant GI workup without specific findings noted.  She 
is at 7.5 with her hemoglobin today.  It looks like that is on the low end of where she typically ran
ges, but it has been down below that in the past.  We will continue to monitor.

4.  Slight confusion this morning.  We will continue to monitor that.  Unclear if it simply just bein
g up all night here in the hospital.

5.  Noncompliance with medications.  The patient's daughter reports that the patient's  is 83 
years old and has a problem with compliance himself.  They are going to work out a plan between she a
nd her sister to help make sure they maintain compliance with their meds.

## 2018-09-25 NOTE — HP
CHIEF COMPLAINT:  Left leg swelling.

 

HISTORY OF PRESENT ILLNESS:  This is a 78-year-old female with past medical history of hyperlipidemia
, breast cancer, hypothyroidism, hypertension, DVTs in the past and PE in the past, presenting with l
eft lower extremity edema and erythema.  The patient reports that she went for her regular followup w
ith her primary care physician and at the followup, patient's daughter pointed out to the primary car
e physician that the patient's lower extremity, especially the left leg has been swollen and there is
 some redness.  Primary care physician at the time being advised the patient's family to come to the 
ED, so the patient can be evaluated with Dopplers of the lower extremity.  Per records, patient felicita claudio had a hemorrhoid surgery and also there was an ulceration at the rectum that was also removed dur
ing the procedure.  Since patient has had the surgery, the patient has been immobile and the patient 
stays in bed most of the time per the daughters.  Patient denies any headache, nausea, vomiting, feve
rs, or chills.

 

REVIEW OF SYSTEMS:  Positive for left lower extremity redness and swelling, otherwise as documented i
n the HPI.  All other systems were reviewed and were negative.

 

PAST MEDICAL HISTORY:  Significant for PE, DVTs, hyperlipidemia, breast cancer, hypothyroidism.

 

PAST SURGICAL HISTORY:  Thyroidectomy, mastectomy, appendectomy, cholecystectomy, left shoulder and l
eft ankle surgeries in the past, hysterectomy.

 

SOCIAL HISTORY:  Former tobacco smoker, quit 10 years ago.  The patient denies any ethanol use and al
so denies any illicit drug use.

 

ALLERGIES:  The patient is allergic to AMOXICILLIN, CEPHALEXIN, NSAIDS, PENICILLIN, PHENAZOPYRIDINE S
ULFA.

 

CURRENT MEDICATIONS:  Sertraline 100 mg, losartan 100 mg, atorvastatin 20 mg, hydrochlorothiazide 12.
5 mg daily, Eliquis 5 mg b.i.d., levothyroxine 137 mcg, tramadol 50 mg, multivitamin, Anusol.

 

PHYSICAL EXAMINATION:

VITAL SIGNS:  Blood pressure 151/98, pulse 64, respiratory rate of 16, temperature of 97.8, O2 sat of
 99.

CONSTITUTIONAL:  The patient is obese, lying in bed, very funny.  She is able to joke and she is very
 lively, speaking in full sentences, does not appear to be in any acute distress, very pleasant.

HEENT:  Normocephalic, atraumatic.  Pupils are equally round and reactive to light.  Extraocular move
ments are intact.  No scleral icterus.

NECK:  Supple.  Trachea is midline.  No deviation.  No tenderness.  No JVDs.

LUNGS:  Clear to auscultation bilaterally.  No wheezing, no rales, no rhonchi is appreciated.

CARDIOVASCULAR:  Positive S1, S2.  Regular rate and rhythm.  No murmurs, no gallops, no rubs apprecia
gabrielle.

ABDOMEN:  Obese abdomen, positive bowel sounds in all quadrants.  No distention, no masses, nontender
.

EXTREMITIES:  Upper extremity:  5/5 upper extremity strength, good pulses bilaterally at the upper ex
tremity.  Lower extremity:  Patient did have some edema, located at the left lower extremity.  There 
is some tenderness with palpation at the left lower extremity and there is some erythema noted and wa
rmth.  Good pedal pulses bilaterally at the lower extremities.

NEUROLOGIC:  Cranial nerves II through XII grossly intact.  No neurologic deficits noted.

SKIN:  Refer to the above at lower extremities.

PSYCHIATRIC:  Normal affect.  Alert, oriented x3.

 

LABORATORY AND DIAGNOSTIC DATA:  Hemoglobin is 8.3, WBC is 9.4, hematocrit is 26.5, platelet is 281,0
00.  Chemistry:  Sodium is 140, potassium is 4.1, chloride is 106, carbon dioxide of 25, anion gap of
 14, BUN is 19, creatinine is 0.86.  Vascular ultrasound of the lower extremity showed positive for a
cute deep venous thrombosis of the left popliteal vein and the left posterior tibial vein.

 

ASSESSMENT AND PLAN:  This is a 78-year-old female with past medical history of deep vein thromboses 
and pulmonary embolisms, being admitted for;

1.  Left lower extremity deep venous thromboses.  Patient was supposed to be on Eliquis, but per fami
ly, the patient has not been taking her medication as instructed.  All patient's medication bottles a
re full and per daughters note that the patient has not been taking the medications as she is suppose
d to take them.  At this point, we will consult Heme/Oncology and we will put patient on Lovenox and 
transition the patient to warfarin since the patient was on Eliquis and patient had deep vein thrombo
ses.

2.  History of pulmonary embolism, stable at this time.  We will monitor the patient.

3.  Hypothyroidism.  We will continue the patient on Synthroid.

4.  Hyperlipidemia.  We will continue the patient on her home medications.

5.  Deep venous thrombosis and gastrointestinal prophylaxis.  The patient is currently going to be on
 Lovenox.  We will transition the patient to warfarin.

## 2018-09-26 NOTE — DIS
DATE OF ADMISSION:  09/24/2018

 

DATE OF DISCHARGE:  09/26/2018

 

DISCHARGE DIAGNOSES:

1.  Deep vein thrombosis, left lower extremity.

2.  Lymphedema, left upper extremity.

3.  Medication noncompliance.

4.  Hypothyroidism.

5.  Hyperlipidemia.

 

HOSPITAL COURSE:  The patient is a 78-year-old female with a history of left lower extremity DVT foll
owing a surgical procedure.  The patient had been on Eliquis for that.  Prior to this admission the p
atient had required a hemorrhoidectomy and had to be off of the Eliquis for one week.  The patient's 
daughter's in follow up after the procedure discovered the patient's medication bottles were not bein
g utilized as prescribed and there was concern the patient had not been compliant with the medication
 given when she was supposed to be on it.  The patient has some memory issues and was felt to be larg
ely responsible for the noncompliance.  The patient presented back to the hospital with left lower ex
tremity edema upon referral from her PCP for an ultrasound.  This did confirm a DVT in the left lower
 extremity in the left popliteal vein and left posterior tibial vein.  Initially, it was felt to be a
 failure of the Eliquis and she was started on Lovenox with anticipation of converting to Coumadin.  
The left upper extremity had some chronic edema as well, but the patient has had breast cancer and wilson
rgery on that side and has some chronic edema.  Ultrasound failed to demonstrate any DVT there.  The 
patient was seen in consultation by Dr. Mode Brower of the Oncology Service and felt the patient was n
ot in Eliquis failure, but a compliance failure and therefore thought the patient was appropriate to 
resume the Eliquis.

 

PHYSICAL EXAMINATION:

VITAL SIGNS:  On the day of discharge, temperature is 96.8-98.7, pulse 57, respirations 18, O2 sat 94
% on room air, /73 up to 167/72.

GENERAL:  The patient is awake, alert, pleasant, cooperative.

HEART:  Regular rate and rhythm.

LUNGS:  Clear bilaterally.

ABDOMEN:  Benign.

EXTREMITIES:  Warm and dry without erythema.

 

DISPOSITION:  The patient is discharged to home.  

 

DISCHARGE MEDICATIONS:  She is to continue her usual home medications including Eliquis 2.5 mg p.o. b
.i.d., Norco 5/325 p.r.n., losartan 100 mg every day, levothyroxine 137 mcg every day, HCTZ 12.5 ever
y day, Lipitor 20 mg every day, sertraline daily.  

 

FOLLOWUP:  She is to follow up with her PCP within the week.  She should return to the emergency depa
rtment should she have any problems prior to that time.  

 

DIET:  Otherwise she will have a regular diet.

 

ACTIVITY:  As tolerated.

## 2019-02-02 NOTE — CT
ABDOMEN AND PELVIC CT SCAN WITHOUT IV CONTRAST:

2/2/19

 

HISTORY: 

Abdominal pain.

 

COMPARISON:  

7/2/18.

 

FINDINGS:  

Moderate to large hiatal hernia. Multiple liver cysts. Status post cholecystectomy with dilatation of
 the common bile duct but no significant intrahepatic ductal dilatation. Pancreas and spleen are unre
markable. Evidence for bilateral adrenal adenomas up to approximately 2.5 cm. Nonobstructing right re
nal calculus with a small 0.3 cm diameter obstructing proximal right ureteral calculus with some prox
imal renal hydronephrosis. No evidence for left renal calculus or acute  obstruction. Fairly extens
torito diffuse colonic diverticulosis without acute diverticulitis. Periumbilical fat containing hernia.
 Multilevel lumbar spinal canal stenosis.

 

IMPRESSION:  

0.3 cm obstructing proximal right ureteral calculus. Mild proximal dilatation and perirenal fat stran
ding. Nonobstructing right renal calculus. Colonic diverticulosis without diverticulitis. Moderate to
 large hiatal hernia. Status post cholecystectomy. Multiple liver cysts. Other findings as above. 

 

POS: SNEHA

## 2019-02-03 NOTE — CON
DATE OF CONSULTATION:  02/03/2019



TYPE OF CONSULTATION:  UROLOGY INPATIENT CONSULTATION



REASON FOR CONSULTATION:  Right ureteral stone and urinary tract infection.



HISTORY OF PRESENT ILLNESS:  Ms. Coates is a 78-year-old female with past urologic

history significant for urinary incontinence and recurrent urinary tract infections,

patient of Dr. Faith White, who presented to the [facility]'s ER

with acute onset of right-sided flank pain associated with nausea and vomiting.  The

patient had a CT of the abdomen and pelvis performed, which demonstrated a 3 mm

right ureteropelvic junction calculus and right hydroureteronephrosis.  She also was

noted to have a UTI with nitrite positive urine and presence of bacteria.  The

patient's symptoms were poorly controlled.  She was transferred to [facility] and admitted.  Overnight, she developed significant hypotension

with systolics in the upper 70s to low 80s.  She was not tachycardic or febrile at

the time.  Urology was consulted for possible sepsis secondary to urinary tract

infection in the presence of right ureteral calculus. 



The patient was transferred to the ICU.  She received IV fluid bolus, and her blood

pressure did respond and came up to systolic in the 140s.  She continues to report

intermittent right flank pain.  Nausea has subsided.  No other complaints. 



REVIEW OF SYSTEMS:  Difficult to obtain secondary to the patient's current

condition, but she denies any chest pain or shortness of breath.  Positive nausea.

No vomiting currently after antiemetics given. 



PAST MEDICAL HISTORY:  Hyperlipidemia, hyperthyroidism, hypertension, DVT, PE,

anemia, diverticulitis, urinary incontinence, recurrent urinary tract infections,

and breast cancer. 



PAST SURGICAL HISTORY:  Bilateral mastectomy with thyroidectomy, appendectomy,

cholecystectomy, hysterectomy, left shoulder and left ankle surgery. 



FAMILY HISTORY:  Noncontributory.



SOCIAL HISTORY:  The patient lives at home with family.  She is a former smoker,

quit greater than 10 years ago.  No alcohol or illicit drugs. 



ALLERGIES:  AMOXICILLIN, ASPIRIN, CEPHALEXIN, NSAIDS, PAPER TAPE, PENICILLIN, AND

PYRIDIUM. 



HOME MEDICATIONS:  

1. Sertraline.

2. Losartan/hydrochlorothiazide.

3. Synthroid.

4. Lipitor.

5. Nitrofurantoin.



PHYSICAL EXAMINATION:

VITAL SIGNS:  Temperature is 98.3, heart rate 70s to 80s, respirations 18, oxygen

saturation 95% on room air, and blood pressure 82 to 140 over 54 to 90. 

GENERAL:  She is awake and alert.  No apparent distress. 

HEENT:  Normocephalic and atraumatic. 

NECK:  Supple.  No masses or lymphadenopathy.  CARDIOVASCULAR:  Regular rate and

rhythm. 

PULMONARY:  Breathing unlabored. 

ABDOMEN:  Obese, soft, nontender/nondistended.  No masses or organomegaly.  No

suprapubic tenderness to palpation.  Mild right CVA tenderness.  No left CVA

tenderness. 

EXTREMITIES:  Warm and well perfused.  Mild bilateral lower extremity edema. 

NEUROLOGIC:  No focal deficits.



LABORATORY DATA:  White blood cell count 11.1, hemoglobin 10.6, hematocrit 34.9,

platelets 235.  Sodium 142, potassium 4.4, chloride 108, bicarb 22, BUN 19, and

creatinine 1.41. 



RADIOLOGY DATA:  CT of the abdomen and pelvis was reviewed.  There is a 3 mm

proximal right ureteral calculus as well as a nonobstructing larger right renal

calculus, right hydroureteronephrosis with perinephric stranding. 



ASSESSMENT:  A 78-year-old female with right ureteral calculus, urinary tract

infection, sepsis secondary to urinary tract infection. 



PLAN:  I reviewed the natural history and clinical implications of ureteral calculi

in the setting of urinary tract infections with the patient and her daughter in

detail.  I explained the need for urgent intervention given concerns for sepsis

related to this UTI and stone.  I discussed options including right percutaneous

nephrostomy placement as well as right ureteral stent placement.  The patient has

responded to fluid boluses and her blood pressure has improved.  I believe she is

stable enough to proceed with right ureteral stent placement.  After

indications/risks/benefits/alternatives/possible outcomes were discussed with the

patient in detail, she elects to proceed with right ureteral stent placement and all

indicated procedures.  I explained that the patient will need a staged right

ureteroscopy with laser lithotripsy when she has had a full course of culture

specific antibiotics and this will be performed as an outpatient. 







Job ID:  [id number]

## 2019-02-03 NOTE — CON
DATE OF CONSULTATION:  02/03/2019



SERVICE:  Pulmonary Medicine.



REASON FOR CONSULT:  ICU patient.



HISTORY OF PRESENT ILLNESS:  The patient is a 78-year-old white female with past

medical history significant for essentially nothing.  She presented to the Emergency

Department and had low blood pressures associated with severe sepsis.  She ended up

getting 3 L of fluid.  They found obstructing stone in the ureter.  She had very

marginal blood pressures when she landed on the floor.  She went down to the

operating room.  A ureteral stent was placed to traverse the stone.  She was left on

mechanical ventilation per my request, because prior to the procedure, she was

hemodynamically unstable.  That being said, her blood pressures have firmed up very

nicely with the fluid resuscitation.  She is tolerating meropenem.  She did have a

listed allergy to penicillin.  Otherwise, I cannot get additional history from the

patient. 



PAST MEDICAL HISTORY:  

1. Dyslipidemia.

2. Hypertension.

3. History of DVT/PE.

4. Diverticulosis.

5. History of nephrolithiasis.

6. Anemia.

7. Hyperthyroidism.



PAST SURGICAL HISTORY:  

1. Mastectomy, bilateral.

2. Thyroidectomy.

3. Appendectomy.

4. Cholecystectomy.

5. Hysterectomy.

6. Left shoulder surgery.

7. Left ankle surgery.



FAMILY HISTORY:  Noncontributory.



SOCIAL HISTORY:  She has a 30 pack-year history of smoking, but quit over 10 years

ago.  Denies any current tobacco or illicit drug products.  She does not use any

significant alcohol. 



ALLERGIES:  AMOXICILLIN, ASPIRIN, CEPHALEXIN, NSAIDS, PAPER TAPE, SULFA.  OF NOTE,

NONE OF THESE THINGS CAUSE ANAPHYLAXIS SO FAR AS WE ARE AWARE.  LIST OF HER

INPATIENT AND OUTPATIENT MEDICATIONS WERE REVIEWED.  MULTIPLE UPDATES WERE MADE AT

THIS TIME. 



REVIEW OF SYSTEMS:  Cannot be obtained as the patient is currently intubated and

sedated. 



PHYSICAL EXAMINATION:

VITAL SIGNS:  Afebrile, pulse 76, blood pressure 121/58, respirations 12,

saturations 91% on 40% FiO2. 

GENERAL:  The patient is awake, but somnolent.  She is requiring some medication to

remain comfortable. 

HEENT:  Normocephalic and atraumatic.  Sclerae white.  Conjunctivae pink.  Oral

mucosa is moist without lesions. 

LUNGS:  Excellent air entry.  There is no prolonged expiratory phase or wheezing

present.  Dependent crackles are noted. 

HEART:  Normal rate and regular. 

ABDOMEN:  Soft, nontender, and nondistended.  Bowel sounds are positive. 

MUSCULOSKELETAL:  No cyanosis or clubbing.  There is no pitting in the bilateral

lower extremities. 

NEUROLOGIC:  Grossly nonfocal.



LABORATORY DATA:  WBC 11.1, hemoglobin 10.6, platelets 235,000.  Creatinine 1.41

(over baseline of 0.8).  Liver function studies are completely unremarkable.  Sodium

142.  Urine is positive for nitrites, and blood.  There is minimal proteinuria

present. 



IMAGING STUDIES:  CT of the abdomen and pelvis demonstrates 3 mm nonobstructing

proximal right ureteral calculus.  There is a moderate to large hiatal hernia.

Cholecystectomy changes are noted.  Diverticulosis is re-demonstrated without

evidence of diverticulitis.  Bilateral adrenal adenomas are also identified. 



ASSESSMENT:  

1. Severe sepsis.

2. Urinary tract infection.

3. Nephrolithiasis, status post ureteral stent, postoperative day 0.

4. Acute hypoxic respiratory failure.



DISCUSSION AND PLAN:  We will get a chest x-ray.  We will keep the patient on

mechanical ventilation for the next 3 or 4 hours.  We are going to make certain that

she does not develop a significant inflammatory profile in the postoperative period.

 We will keep her on some broad-spectrum antibiotics directed at  pathology.  In

3-4 hours, if she remains hemodynamically stable, we will convert her over to

spontaneous breathing trial, and consider her for extubation.  Pulmonary Critical

Care will continue to follow closely. 



CRITICAL CARE TIME:  30 minutes.







Job ID:  [id number]

## 2019-02-03 NOTE — PDOC.PN
- Subjective


Encounter Start Date: 02/03/19


Encounter Start Time: 17:25


Subjective: f/u for sepsis due to R-sided pyelonephritis and UTI s/p R ureteral 

stent


-: placement due to 3mm stone proximally. Extubated today and stable per nsg


-: report. Receiving Meropenem currently





- Objective


Resuscitation Status - Order Detail:





02/02/19 21:19


Resuscitation Status Routine 


   Resuscitation Status: FULL: Full Resuscitation








MAR Reviewed: Yes


Vital Signs & Weight: 


 Vital Signs (12 hours)











  Temp Pulse Resp BP Pulse Ox


 


 02/03/19 16:00      99


 


 02/03/19 15:00  98.9 F    


 


 02/03/19 12:54      98


 


 02/03/19 12:00  98.4 F   21 H  


 


 02/03/19 10:09   57 L   


 


 02/03/19 10:00    17  


 


 02/03/19 08:00    16   94 L


 


 02/03/19 07:00  99.0 F    


 


 02/03/19 06:05   72   165/57 H 


 


 02/03/19 06:00  98.4 F    








 Weight











Weight                         271 lb 3 oz











 Most Recent Monitor Data











Heart Rate from ECG            62


 


NIBP                           147/59


 


NIBP BP-Mean                   88


 


Respiration from ECG           17


 


SpO2                           97














I&O: 


 











 02/02/19 02/03/19 02/04/19





 06:59 06:59 06:59


 


Intake Total  300 128


 


Output Total   200


 


Balance  300 -72











Result Diagrams: 


 02/03/19 07:32





 02/03/19 07:32


Radiology Reviewed by me: Yes (PCXR - ETT in place, no acute infiltrate)


EKG Reviewed by me: Yes (Tele - SR)





Phys Exam





- Physical Examination


Constitutional: NAD


HEENT: PERRLA, sclera anicteric, oral pharynx no lesions


Neck: no nodes, no JVD, supple, full ROM


Respiratory: no wheezing, no rales, no rhonchi, clear to auscultation bilateral


S1, S2


Cardiovascular: RRR, no significant murmur, no rub, gallop


Gastrointestinal: soft, non-tender, no distention, positive bowel sounds


Musculoskeletal: pulses present, edema present


Neurological: normal sensation, moves all 4 limbs


Psychiatric: A&O x 3


Skin: normal turgor, cap refill <2 seconds





Dx/Plan


(1) Severe sepsis with septic shock


Code(s): A41.9 - SEPSIS, UNSPECIFIED ORGANISM; R65.21 - SEVERE SEPSIS WITH 

SEPTIC SHOCK   Status: Acute   Comment: Secondary to UTI/pyelonephritis, 

continue Meropenem, IVF's, s/p R ureteral stent placement 2/3/19   





(2) Pyelonephritis, acute


Code(s): N10 - ACUTE PYELONEPHRITIS   Status: Acute   Comment: Secondary to R 

ureteral calculus, continue IVF's and Meropenem   





(3) KOLTON (acute kidney injury)


Code(s): N17.9 - ACUTE KIDNEY FAILURE, UNSPECIFIED   Status: Acute   Comment: 

Multifactorial given calculus and sepsis, avoid nephrotoxic meds and limit 

contrast, serial creatinine   





(4) Ureteral calculus


Code(s): N20.1 - CALCULUS OF URETER   Status: Acute   Comment: s/p R ureteral 

stent placement   





- Plan


plan discussed w/ family, continue antibiotics, PT/OT, , 

incentive spirometry, out of bed/ambulate, DVT proph w/SCDs


Stable currently


-: Continue Meropenem


-: Pain control as clinically indicated


-: Transfer to surgical floor


-: AM lab: BMP, CBC





* .

## 2019-02-03 NOTE — OP
DATE OF PROCEDURE:  02/03/2019



ASSISTANT:  None.



PREPROCEDURE DIAGNOSES:  

1. Right ureteral calculus.

2. Right hydronephrosis.

3. Urinary tract infection.

4. Sepsis secondary to urinary tract infection.



POSTPROCEDURE DIAGNOSES:  

1. Right ureteral calculus.

2. Right hydronephrosis.

3. Urinary tract infection.

4. Sepsis secondary to urinary tract infection.



PROCEDURES PERFORMED:  Right ureteral stent placement, 6-Chadian x 24 cm.



ANESTHESIA:  General endotracheal anesthesia.



COMPLICATIONS:  None.



FLUIDS:  See anesthesia record.



BLOOD LOSS:  Less than 1 mL.



SPECIMENS:  Right renal pelvis urine for culture.



POSTPROCEDURE STATUS:  Satisfactory.



INDICATIONS FOR PROCEDURE:  Ms. Coates is a 78-year-old female with no past history

of urolithiasis who presented with acute onset of right-sided flank pain associated

with intractable nausea and vomiting.  She was also diagnosed with urinary tract

infection.  She was admitted to [facility] after being transferred from

[facility].  The patient became hypotensive overnight.  She ended up responding

to fluid boluses.  However, given the setting of the infection and ureteral stone,

ureteral stent was recommended.  After

indications/risks/benefits/alternatives/possible outcomes were discussed with the

patient in detail, she elected to proceed with right ureteral stent placement and

all indicated procedures. 



DESCRIPTION OF PROCEDURE:  The patient was taken to the operating room and after

successful induction of general endotracheal anesthesia, she was placed in the

dorsal lithotomy position and genitalia was prepped and draped in usual sterile

fashion.  A time-out was performed.  Following which, a 21-Chadian rigid cystoscope

was inserted in the patient's urethra and advanced into the bladder.  Complete

cystoscopy was performed.  There were multiple inflammatory changes within the

bladder consistent with cystitis, but no other concerning mucosal lesions were

identified.  The right ureteral orifice was identified.  It was cannulated with a

5-Chadian open-ended ureteral catheter which was advanced into the distal ureter.  An

angled tip 0.035 inch Glidewire was advanced through the open-ended catheter and

curl within the right renal pelvis.  We ran the ureteral catheter all the way up the

wire into the right renal pelvis.  The wire was removed.  There was a hydronephrotic

drip present.  This urine was obtained and sent for culture.  We removed the

cystoscope from the bladder leaving the open-ended catheter in place.  A very small

amount of 50:50 contrast was injected through the open-ended catheter to perform a

retrograde pyelogram.  We did this with just approximately 3 mL to avoid high

pressure just enough to outline the right renal pelvis.  At this point, the wire was

replaced and the open-ended catheter was removed.  A 6-Chadian x 24 cm double-J

ureteral stent was placed over the wire.  Upon wire removal, a good curl was

achieved proximally within the right renal pelvis and distally within the bladder.

The patient's bladder was drained.  Distal end of the ureteral stent had a good curl

and was draining purulent urine.  The patient tolerated the procedure well.  She was

not woken from anesthesia, she was sent to the ICU intubated secondary to

hypotension and at the request of the intensivist.  The patient was in stable

condition at the end of the case. 







Job ID:  [id number]

## 2019-02-03 NOTE — RAD
IVP RETROGRADE EVALUATION;

 

Date:  02/03/19 

 

INDICATION:

Right ureteral stent. 

 

IMPRESSION: 

Since the comparison CT examination dated 02/02/19, there has been placement of a right ureteral sten
t. The stent projects in the expected position. No suspicious calcification is seen along the course 
of the stent. The previously seen 3.0 mm proximal right ureteral stone is no longer identified. There
 is mild prominence of the right renal collecting system remaining on the provided image. 

 

 

POS: [facility]

## 2019-02-03 NOTE — HP
PRIMARY CARE PHYSICIAN:  Dr. Martino.



CODE STATUS:  For this patient is full code.



TIME OF EVALUATION:  8:20 p.m.



CHIEF COMPLAINT:  Right-sided flank pain.



HISTORY OF PRESENT ILLNESS:  This is a 78-year-old female patient with past 
medical

history of hyperlipidemia, hyperthyroidism, hypertension, heart murmur, DVT, PE,

anemia, and diverticulitis, came to the hospital after having severe right flank

pain radiating to the groin area with no clear triggers, no alleviating factors.

The symptoms are moderate-to-severe, needing opioids pain medication for 
optimal control of

the pain.  The symptoms started insidiously and has been gradually getting worse
,

symptoms started since early this morning. 



REVIEW OF SYSTEMS:  CONSTITUTIONAL:  No fever or chills.  The patient reported

generalized weakness. 

RESPIRATORY:  No cough, sputum production, or shortness of breath. 

CARDIOVASCULAR:  No chest pain or palpitation. 

GASTROINTESTINAL:  No nausea.  No vomiting, diarrhea, or abdominal pain. 

CNS:  No dizziness, headache, or feeling lightheaded. 

GENITOURINARY:  The patient has right-sided flank pain radiating to the groin 
area. 

EXTREMITIES:  No leg swelling. 



All other systems were reviewed and negative except for the findings mentioned 
above.



PAST MEDICAL HISTORY:  As mentioned in HPI.



PAST SURGICAL HISTORY:  

1. Bilateral mastectomy with thyroidectomy.

2. Appendectomy.

3. Cholecystectomy.

4. Hysterectomy.

5. Left shoulder and left ankle broken surgery.



PSYCH HISTORY:  The patient has a history of anxiety and depression.  Lives at 
home

with family.  The patient is a former tobacco user, quit smoking more than 10 
years

ago.  No alcohol.  No drugs. 



FAMILY HISTORY:  Reviewed and noncontributory for current presentation.



ALLERGIES:  TO AMOXICILLIN, ASPIRIN, CEPHALEXIN, NSAIDS, PAPER TAPE, PENICILLIN,

PHENAZOPYRIDINE, __________ 



REPORTED MEDICATIONS:  Sertraline, losartan/hydrochlorothiazide, levothyroxine,

Lipitor, and nitrofurantoin. 



PHYSICAL EXAMINATION:

VITAL SIGNS:  On presentation blood pressure 179/66 with heart rate 62, 
respiratory

rate was 18. 

GENERAL APPEARANCE:  The patient is alert and oriented with chills, in mild

distress. 

HEENT:  Eyes, normal conjunctivae.  ENT; moist oral mucosa.  Anicteric.  No 
JVD. 

RESPIRATORY:  Bilateral air entry.  No rales.  No wheezing.  Symmetric 
expansion. 

CARDIOVASCULAR:  Normal rate, regular rhythm.  No murmurs.  No gallops.  No 
edema. 

ABDOMEN:  Soft.  Normal bowel sounds. 

MUSCULOSKELETAL:  Baseline range of motion and strength.  No tenderness. 

SKIN:  Warm, intact.  No pallor.  No rash.  No redness.  Peripheral pulses are

present.  Capillary refill seems to be intact. 

NEUROLOGIC:  No evidence of any new focal weakness.  Baseline speech.  Cranial

nerves seems to be intact. 

: right flank pain

PSYCH:  The patient is in good mood.  No anxiety.  Optimal judgment.



IMAGING STUDIES:  Abdomen and pelvis CT, the patient has 0.3 cm obstructing 
proximal

right ureteral calculus, mild proximal dilation and perinephric fat stranding.

Nonobstructing right renal calculus.  Colonic diverticulosis without 
diverticulitis

and moderate to large hiatal hernia status post cholecystectomy.  Multiple liver

cysts, other findings as above. 



LABORATORY DATA:  Labs were reviewed.  The patient has white count 11.1 with

hemoglobin 10.6, hematocrit 34.9, MCV 90, platelet count 235.  Sodium was 142,

potassium 4.4, chloride 108, carbon dioxide 22, anion gap 16, BUN 19, creatinine

1.41.  Previous creatinine was 1.0, glucose 132.  Urine was done and it showed 
white

count 11 to 20. 



ASSESSMENT AND PLAN:  The patient was placed in the hospital with following 
medical

problems: 

1. Acute pyelonephritis:  The patient has been started on antibiotics, follow

cultures, we will adjust treatment as needed. 

2. Small kidney stone obstructing with no significant hydronephrosis.  We will

hydrate, we will give pain medications, started on antibiotics, we will need 
Urology

consultation.

3. Acute kidney injury.  The patient has increasing creatinine of more than 0.3

mg/dL.  We will hydrate, we will adjust treatment as needed.  If no improvement,

might need help from Nephro. 

4. Hyperlipidemia, reconcile home medications, low-cholesterol diet is advised.

5. Uncontrolled hypertension.  The patient presented with systolic blood 
pressure of

194, which is hypertensive, reconcile home medications, we will not treat

aggressively since the patient has an infection and risk for septic shock. . 

6. Deep venous thrombosis prophylaxis.

7. Glucose of 132.  It could be secondary to poor tolerance of glucose or

hyperglycemia due to underlying infection and stress.  We will monitor and we 
will

treat accordingly. 







Job ID:  [id number]



[facility]

## 2019-02-03 NOTE — RAD
CHEST 1 VIEW:

 

Date:  02/03/19 

 

HISTORY:  

Intubation. 

 

FINDINGS:

Heart size is enlarged. Endotracheal tube is in satisfactory position. Film is of suboptimal inspirat
ion. There is atelectatic change in the lung bases. There are arthritic changes of the spine. 

 

IMPRESSION: 

1.  Cardiomegaly. 

2.  Endotracheal tube in satisfactory position. 

 

 

POS: [facility]

## 2019-02-04 NOTE — PDOC.PN
- Subjective


Encounter Start Date: 02/04/19


Encounter Start Time: 19:00


Subjective: f/u for severe sepsis with shock due to pyelonephritis with proteus 

spp


-: on current Meropenem. Feels better overall. 





- Objective


Resuscitation Status - Order Detail:





02/02/19 21:19


Resuscitation Status Routine 


   Resuscitation Status: FULL: Full Resuscitation








MAR Reviewed: Yes


Vital Signs & Weight: 


 Vital Signs (12 hours)











  Temp Pulse Resp BP Pulse Ox


 


 02/04/19 16:17  97.4 F L  66  16  125/73  96


 


 02/04/19 11:44  98.1 F   16  117/70  96


 


 02/04/19 07:32  97.7 F  69  18  128/69  94 L








 Weight











Weight                         271 lb











 Most Recent Monitor Data











Heart Rate from ECG            62


 


NIBP                           142/72


 


NIBP BP-Mean                   95


 


Respiration from ECG           20


 


SpO2                           97














I&O: 


 











 02/03/19 02/04/19 02/05/19





 06:59 06:59 06:59


 


Intake Total 300 905 


 


Output Total  970 


 


Balance 300 -65 











Result Diagrams: 


 02/04/19 06:17





 02/04/19 06:17


Additional Labs: 





Microbiology





02/03/19 05:44   Urine   Bacterial Culture - Preliminary


                              Presumptive Proteus mirabilis


02/03/19 05:44   Urine   Bacterial Culture - Preliminary





 Laboratory Tests











  02/02/19 02/02/19 02/03/19





  15:56 15:56 07:32


 


WBC   11.1 H 


 


Hgb   10.6 L 


 


Band Neuts % (Manual)   


 


Creatinine  1.41 H   1.43 H














  02/03/19 02/04/19





  07:32 06:17


 


WBC  32.5 H 


 


Hgb  10.0 L 


 


Band Neuts % (Manual)  36 H  14 H


 


Creatinine  














Phys Exam





- Physical Examination


Constitutional: NAD


HEENT: PERRLA, sclera anicteric, oral pharynx no lesions


Neck: no nodes, no JVD, supple, full ROM


diminished in bases


Respiratory: no wheezing, no rales, no rhonchi


S1, S2


Cardiovascular: RRR, no significant murmur, no rub, gallop


obese


Gastrointestinal: soft, no distention, positive bowel sounds


Musculoskeletal: pulses present, edema present


Neurological: normal sensation, moves all 4 limbs


Psychiatric: A&O x 3


Skin: normal turgor, cap refill <2 seconds





Dx/Plan


(1) Severe sepsis with septic shock


Code(s): A41.9 - SEPSIS, UNSPECIFIED ORGANISM; R65.21 - SEVERE SEPSIS WITH 

SEPTIC SHOCK   Status: Acute   Comment: Secondary to UTI/pyelonephritis with 

acute organ dysfunction and septic shock, continue Meropenem, IVF's, s/p R 

ureteral stent placement 2/3/19, continue IV abx, BP improved   





(2) Pyelonephritis, acute


Code(s): N10 - ACUTE PYELONEPHRITIS   Status: Acute   Comment: Secondary to R 

ureteral calculus, continue IVF's and Meropenem, Proteus spp isolated   





(3) KOLTON (acute kidney injury)


Code(s): N17.9 - ACUTE KIDNEY FAILURE, UNSPECIFIED   Status: Acute   Comment: 

Multifactorial given calculus and sepsis, avoid nephrotoxic meds and limit 

contrast, serial creatinine, improved   





(4) Ureteral calculus


Code(s): N20.1 - CALCULUS OF URETER   Status: Acute   Comment: s/p R ureteral 

stent placement   





- Plan


plan discussed w/ family, continue antibiotics, PT/OT, , out of 

bed/ambulate, DVT proph w/SCDs


Stable currently


-: Continue Meropenem


-: OOB with PT


-: Pain control as clinically indicated


-: AM lab: BMP, CBC





* .

## 2019-02-04 NOTE — PQF
TYLER GOLDSTEINSTORM DO

[id number]                                                             SURG A-
3334

[id number]                             

                                   

CLINICAL DOCUMENTATION IMPROVEMENT CLARIFICATION FORM:  ICD-10 Updated



PLEASE DO AN ADDENDUM TO THE PROGRESS NOTE WITH ANY DOCUMENTATION UPDATES OR 
ADDITIONS AND CARRY THROUGH TO DC SUMMARY.   THANK YOU.



DATE:  2/4/2018                             ATTN:  DR. WALKER



Please exercise your independent, professional judgment in responding to the 
clarification form. 

Clinical indicators are provided on the bottom of this form for your review



Please check appropriate box(s):

BMI > 40 with associated diagnosis of:     [  ] Morbid (Severe) Obesity

                  [  ] Due to excess calories

                 [  ] with Alveolar Hypoventilation (Pickwickian syndrome)

         

            [  ] Overweight 

         

            [  ] Obesity 



            [  ] Other diagnosis ___________



            [ x ] Unable to determine







For continuity of documentation, please document condition throughout progress 
notes and discharge summary.  Thank You.



BMI < 19   Under weight

19    - 24.9   Healthy

25.0 - 29.9   Slightly Overweight

30.0 - 34.9   Obese

35.0 - 39.9   Severely Obese

40.0 and Over   Morbidly Obese





CLINICAL INDICATORS - SIGNS / SYMPTOMS / LABS

**2/2-NN: BMI OF 45.1....  HT: 5'5" and WT: 271-lbs

2 PERSON ASSIST. USES WALKER AT HOME.

**2/3-NN INCLUDES MORBID OBESITY-YES





RISK FACTORS

2 PERSON ASSIST. USES WALKER AT HOME.





TREATMENTS

HEART HEALTHY DIET



 



Thank you, 

Natty





(This form is maintained as a part of the permanent medical record)

 2015 [employer]  All Rights Reserved

Natty Khalil RN, CDIS    [email or web address]    [phone] 
[facility]

## 2019-02-04 NOTE — PQF
TYLER GOLDSTEINSTORM DO

[id number]                                                             SURG A-
3334

[id number]                             

                                   

CLINICAL DOCUMENTATION IMPROVEMENT CLARIFICATION FORM:  ICD-10 Updated



PLEASE DO AN ADDENDUM TO THE PROGRESS NOTE WITH ANY DOCUMENTATION UPDATES OR 
ADDITIONS AND CARRY THROUGH TO DC SUMMARY.   THANK YOU.



DATE:   2/4/2019                                      ATTN:   DR. WALKER



Please exercise your independent, professional judgment in responding to the 
clarification form. 

Clinical indicators are provided on the bottom of this form for your review



Please check appropriate box(es):

   

   [ x ] Severe sepsis **DUE TO PYELONEPHRITIS** with acute organ dysfunction of
:  **SEPTIC SHOCK**

     

   [  ] Sepsis due to: **PYELONEPHRITIS**



   [  ] Localized infection without sepsis



   [  ] Other diagnosis ___________



   [  ] Unable to determine





In addition, please specify:

   ***IMPORTANT***   Present on Admission (POA):  [ x ] Yes       [  ] No      
  [  ] Unable to determine











For continuity of documentation, please document condition throughout progress 
notes and discharge summary.  Thank You.



CLINICAL INDICATORS - SIGNS / SYMPTOMS / LABS

**2/3 WBC: 32.5...  2/4 WBC: 19.2

**BP... 2/2 @ 2323 = 105/68.  2/3 @ 0315 = 89/55. 2/3 @ 0320 = 82/54

**INITIAL PULSE TACHYCARDIC AT 80. 

**URINE CX: PRELIMINARY REPORT IS PRESUMPTIVE FOR PROTEUS MIRABILIS. 

**NN: 2/3 AT 0331... BP DROPPED TO 80S/50S FROM 180 AND THEN 140 SYST IN ER AND 
THEN 105 ON FLOOR. GIVEN 1L IVF BOLUS. TX TO IMCU AND 1G MERREM. 

**2/3-PULM/BRADING: SHE PRESENTED TO THE ED AND HAD LOW BP ASSOCIATED WITH 
SEVERE SEPSIS.  

**2/3-DENISE: F/U FOR SEPSIS D/T R-SIDED PYELO AND UTI S/P R URETERAL STENT. 1-
SEVERE SEPSIS WITH SEPTIC SHOCK





RISK FACTORS

*2/2-H&P-DAVIN: 1-ACUTE PYELONEPHRITIS. 2-SMALL KIDNEY STONE OBSTRUCTING WITH NO 
SIGNIFICANT HYDRONEPHROSIS. 3-KOLTON

*2/3-OR: PREOP/POSTOP: RIGHT URETERAL CALCULUS. RIGHT HYDRONEPHROSIS. UTI. 
SEPSIS 2/2 UTI. 





TREATMENTS

**NN: 2/3 AT 0331... BP DROPPED TO 80S/50S FROM 180 AND THEN 140 SYST IN ER AND 
THEN 105 ON FLOOR. GIVEN 1L IVF BOLUS. TX TO IMCU AND 1G MERREM. 

**2/3-OR: PROCEDURE-RIGHT URETERAL STENT PLACEMENT; 6-fr X 24cm. 









Thank you, 

Natty



(This form is maintained as a part of the permanent medical record)

 2015 [employer].  All Rights Reserved

Natty Khalil RN, CDIS    [email or web address]    [phone] 
[facility]

## 2019-02-05 NOTE — PDOC.PN
- Subjective


Encounter Start Date: 02/05/19


Encounter Start Time: 09:40





Doing well.  Symptoms have improved.  Pain resolved.  





- Objective


Resuscitation Status - Order Detail:





02/02/19 21:19


Resuscitation Status Routine 


   Resuscitation Status: FULL: Full Resuscitation








Vital Signs & Weight: 


 Vital Signs (12 hours)











  Temp Pulse Resp BP Pulse Ox


 


 02/05/19 15:39  97.9 F  58 L  18  155/73 H  96


 


 02/05/19 10:50  98.0 F  64  22 H  150/66 H  98








 Weight











Weight                         271 lb











 Most Recent Monitor Data











Heart Rate from ECG            62


 


NIBP                           142/72


 


NIBP BP-Mean                   95


 


Respiration from ECG           20


 


SpO2                           97














I&O: 


 











 02/04/19 02/05/19 02/06/19





 06:59 06:59 06:59


 


Intake Total 905 580 


 


Output Total 970  


 


Balance -65 580 











Result Diagrams: 


 02/05/19 06:35





 02/05/19 06:35





Phys Exam





- Physical Examination


Constitutional: NAD


Respiratory: no wheezing, no rales, no rhonchi, clear to auscultation bilateral


Cardiovascular: RRR, no significant murmur


Gastrointestinal: soft, non-tender, no distention, positive bowel sounds


Musculoskeletal: no edema


Neurological: non-focal





Dx/Plan


(1) Severe sepsis with septic shock


Code(s): A41.9 - SEPSIS, UNSPECIFIED ORGANISM; R65.21 - SEVERE SEPSIS WITH 

SEPTIC SHOCK   Status: Acute   Comment: Secondary to UTI/pyelonephritis with 

acute organ dysfunction and septic shock, continue Meropenem, IVF's, s/p R 

ureteral stent placement 2/3/19, continue IV abx, BP improved   





(2) Pyelonephritis, acute


Code(s): N10 - ACUTE PYELONEPHRITIS   Status: Acute   Comment: Secondary to R 

ureteral calculus, continue IVF's and Meropenem, Proteus spp isolated   





(3) Ureteral calculus


Code(s): N20.1 - CALCULUS OF URETER   Status: Acute   Comment: s/p R ureteral 

stent placement   





(4) KOLTON (acute kidney injury)


Code(s): N17.9 - ACUTE KIDNEY FAILURE, UNSPECIFIED   Status: Acute   Comment: 

Multifactorial given calculus and sepsis, avoid nephrotoxic meds and limit 

contrast, serial creatinine, improved   





(5) Anemia


Code(s): D64.9 - ANEMIA, UNSPECIFIED   Status: Acute   





(6) Anxiety


Code(s): F41.9 - ANXIETY DISORDER, UNSPECIFIED   Status: Acute   





(7) History of DVT (deep vein thrombosis)


Code(s): Z86.718 - PERSONAL HISTORY OF OTHER VENOUS THROMBOSIS AND EMBOLISM   

Status: Acute   





- Plan





* Doing well.  Continue IV abx and follow up on final culture results.


* low colony counts of presumptive Proteus.  May not get good sensitivities 

given the low counts.  Given the severity of the sepsis, will make every effort 

to ensure appropriate abx before transitioning. 


* Getting outpatient IV iron infusions.  Due for another on Friday.  


* Urology following.

## 2019-02-05 NOTE — PRG
DATE OF SERVICE:  02/04/2019



SERVICE:  Pulmonary Medicine.



INTERVAL HISTORY:  The patient is doing outstanding from respiratory standpoint.

She is on room air.  She denies any chest discomfort, nausea, or vomiting.  She is

not having any flank discomfort at this point.  Otherwise, she is returning to her

usual state of health. 



PHYSICAL EXAMINATION:

VITAL SIGNS:  Afebrile.  Pulse 68, blood pressure 125/73, respirations 16, and

saturation 96% on room air. 

GENERAL:  The patient is awake and alert, in no apparent distress. 

LUNGS:  Excellent air entry.  There is no prolonged expiratory phase or wheezing

present. 

HEART:  Normal rate regular. 

ABDOMEN:  Soft, nontender, and nondistended.  Bowel sounds are positive.  No

costovertebral angle tenderness is noted. 

MUSCULOSKELETAL:  No cyanosis or clubbing.  No pitting in the bilateral lower

extremities. 

NEUROLOGIC:  Grossly nonfocal.



LABORATORY DATA:  WBC 19.2 and downtrending, hemoglobin 9.1, and platelets 175,000.

Basic metabolic profile is essentially unremarkable otherwise.  Bacterial culture

from the urine is growing Proteus mirabilis. 



ASSESSMENT:  

1. Severe sepsis.

2. Urinary tract infection secondary to Proteus mirabilis.

3. Nephrolithiasis, status post ureteral stent, postop day #1.

4. Acute hypoxic respiratory failure, resolved.



DISCUSSION AND PLAN:  The patient is doing fantastic from a respiratory standpoint.

She is on good antibiotics that is clearing her inflammatory profile.  At this

point, she has no further requirements for inpatient Pulmonary or Critical Care

opinion, and I will sign off.  Please call with additional questions or concerns

moving forward.  We will focus our efforts on mobilizing the patient through time. 







Job ID:  [id number]

## 2019-02-05 NOTE — PRG
DATE OF SERVICE:  02/05/2019



SUBJECTIVE:  The patient resting comfortably.  Denies any pain.  Reports being

tired.  No other complaints. 



OBJECTIVE:  VITAL SIGNS:  Temperature 98, pulse 64, respirations 18, ox saturation

98% on room air, and blood pressure 150/66. 

GENERAL:  She is awake and alert, in no apparent distress. 

CARDIOVASCULAR:  Regular rate and rhythm. 

PULMONARY:  Breathing unlabored. 

ABDOMEN:  Soft, nontender/nondistended. 

EXTREMITIES:  Warm and well perfused, no edema. 

NEUROLOGIC:  No focal deficits.



LABORATORY DATA:  Microbiology, urine culture demonstrates Proteus, susceptibilities

are pending. 



ASSESSMENT:  A 78-year-old female with Proteus urinary tract infection, sepsis

secondary to urinary tract infection, right ureteral calculus with right

hydroureteronephrosis, status post right ureteral stent placement. 



PLAN:  The patient has improved clinically.  Continue broad-spectrum antibiotics

until these can be narrowed based on sensitivities.  The patient should be

discharged home on a 2-week course of culture specific antibiotics.  In

approximately 1 to 2 weeks, we will plan to proceed with ureteroscopy with laser

lithotripsy for definitive management of this stone. 







Job ID:  [id number]

## 2019-03-18 ENCOUNTER — HOSPITAL ENCOUNTER (INPATIENT)
Dept: HOSPITAL 92 - ERS | Age: 79
LOS: 2 days | Discharge: HOME | DRG: 394 | End: 2019-03-20
Attending: FAMILY MEDICINE | Admitting: FAMILY MEDICINE
Payer: MEDICARE

## 2019-03-18 ENCOUNTER — HOSPITAL ENCOUNTER (OUTPATIENT)
Dept: HOSPITAL 9 - MADLAB | Age: 79
Discharge: HOME | End: 2019-03-18
Payer: MEDICARE

## 2019-03-18 VITALS — BODY MASS INDEX: 43.2 KG/M2

## 2019-03-18 DIAGNOSIS — Z87.891: ICD-10-CM

## 2019-03-18 DIAGNOSIS — D50.0: Primary | ICD-10-CM

## 2019-03-18 DIAGNOSIS — E66.01: ICD-10-CM

## 2019-03-18 DIAGNOSIS — I82.91: ICD-10-CM

## 2019-03-18 DIAGNOSIS — E03.9: ICD-10-CM

## 2019-03-18 DIAGNOSIS — F32.9: ICD-10-CM

## 2019-03-18 DIAGNOSIS — Z79.01: ICD-10-CM

## 2019-03-18 DIAGNOSIS — D64.9: ICD-10-CM

## 2019-03-18 DIAGNOSIS — I12.9: ICD-10-CM

## 2019-03-18 DIAGNOSIS — N18.2: ICD-10-CM

## 2019-03-18 DIAGNOSIS — E78.5: ICD-10-CM

## 2019-03-18 DIAGNOSIS — K64.4: Primary | ICD-10-CM

## 2019-03-18 DIAGNOSIS — K57.90: ICD-10-CM

## 2019-03-18 LAB
ALBUMIN SERPL BCG-MCNC: 3.6 G/DL (ref 3.4–4.8)
ALP SERPL-CCNC: 46 U/L (ref 40–150)
ALT SERPL W P-5'-P-CCNC: 12 U/L (ref 8–55)
ANION GAP SERPL CALC-SCNC: 10 MMOL/L (ref 10–20)
ANISOCYTOSIS BLD QL SMEAR: (no result) (100X)
AST SERPL-CCNC: 14 U/L (ref 5–34)
BASOPHILS # BLD AUTO: 0 THOU/UL (ref 0–0.2)
BASOPHILS # BLD AUTO: 0 THOU/UL (ref 0–0.2)
BASOPHILS NFR BLD AUTO: 0.1 % (ref 0–1)
BASOPHILS NFR BLD AUTO: 0.4 % (ref 0–1)
BILIRUB SERPL-MCNC: 0.2 MG/DL (ref 0.2–1.2)
BUN SERPL-MCNC: 21 MG/DL (ref 9.8–20.1)
CALCIUM SERPL-MCNC: 8.9 MG/DL (ref 7.8–10.44)
CHLORIDE SERPL-SCNC: 107 MMOL/L (ref 98–107)
CO2 SERPL-SCNC: 25 MMOL/L (ref 23–31)
CREAT CL PREDICTED SERPL C-G-VRATE: 0 ML/MIN (ref 70–130)
EOSINOPHIL # BLD AUTO: 0.1 THOU/UL (ref 0–0.7)
EOSINOPHIL # BLD AUTO: 0.1 THOU/UL (ref 0–0.7)
EOSINOPHIL NFR BLD AUTO: 0.9 % (ref 0–10)
EOSINOPHIL NFR BLD AUTO: 1.4 % (ref 0–10)
GLOBULIN SER CALC-MCNC: 2.2 G/DL (ref 2.4–3.5)
GLUCOSE SERPL-MCNC: 104 MG/DL (ref 83–110)
HGB BLD-MCNC: 5.7 G/DL (ref 12–16)
HGB BLD-MCNC: 6 G/DL (ref 12–16)
LYMPHOCYTES # BLD AUTO: 1 THOU/UL (ref 1.2–3.4)
LYMPHOCYTES # BLD: 1.1 THOU/UL (ref 1.2–3.4)
LYMPHOCYTES NFR BLD AUTO: 13 % (ref 21–51)
LYMPHOCYTES NFR BLD AUTO: 13.8 % (ref 21–51)
MCH RBC QN AUTO: 28 PG (ref 27–31)
MCH RBC QN AUTO: 28.4 PG (ref 27–31)
MCV RBC AUTO: 95 FL (ref 78–98)
MCV RBC AUTO: 96.1 FL (ref 78–98)
MDIFF COMPLETE?: YES
MONOCYTES # BLD AUTO: 0.7 THOU/UL (ref 0.11–0.59)
MONOCYTES # BLD AUTO: 0.7 THOU/UL (ref 0.11–0.59)
MONOCYTES NFR BLD AUTO: 9.2 % (ref 0–10)
MONOCYTES NFR BLD AUTO: 9.4 % (ref 0–10)
NEUTROPHILS # BLD AUTO: 5.9 THOU/UL (ref 1.4–6.5)
NEUTROPHILS # BLD AUTO: 6 THOU/UL (ref 1.4–6.5)
NEUTROPHILS NFR BLD AUTO: 75.2 % (ref 42–75)
NEUTROPHILS NFR BLD AUTO: 76.4 % (ref 42–75)
PLATELET # BLD AUTO: 150 THOU/UL (ref 130–400)
PLATELET # BLD AUTO: 178 THOU/UL (ref 130–400)
POLYCHROMASIA BLD QL SMEAR: (no result) (100X)
POLYCHROMASIA BLD QL SMEAR: (no result) (100X)
POTASSIUM SERPL-SCNC: 4.1 MMOL/L (ref 3.5–5.1)
RBC # BLD AUTO: 2.04 MILL/UL (ref 4.2–5.4)
RBC # BLD AUTO: 2.11 MILL/UL (ref 4.2–5.4)
SODIUM SERPL-SCNC: 138 MMOL/L (ref 136–145)
WBC # BLD AUTO: 7.9 THOU/UL (ref 4.8–10.8)
WBC # BLD AUTO: 7.9 THOU/UL (ref 4.8–10.8)

## 2019-03-18 PROCEDURE — 86901 BLOOD TYPING SEROLOGIC RH(D): CPT

## 2019-03-18 PROCEDURE — 86900 BLOOD TYPING SEROLOGIC ABO: CPT

## 2019-03-18 PROCEDURE — 36415 COLL VENOUS BLD VENIPUNCTURE: CPT

## 2019-03-18 PROCEDURE — 84484 ASSAY OF TROPONIN QUANT: CPT

## 2019-03-18 PROCEDURE — 82746 ASSAY OF FOLIC ACID SERUM: CPT

## 2019-03-18 PROCEDURE — 80053 COMPREHEN METABOLIC PANEL: CPT

## 2019-03-18 PROCEDURE — 83540 ASSAY OF IRON: CPT

## 2019-03-18 PROCEDURE — 85046 RETICYTE/HGB CONCENTRATE: CPT

## 2019-03-18 PROCEDURE — 82274 ASSAY TEST FOR BLOOD FECAL: CPT

## 2019-03-18 PROCEDURE — 80048 BASIC METABOLIC PNL TOTAL CA: CPT

## 2019-03-18 PROCEDURE — P9016 RBC LEUKOCYTES REDUCED: HCPCS

## 2019-03-18 PROCEDURE — 36430 TRANSFUSION BLD/BLD COMPNT: CPT

## 2019-03-18 PROCEDURE — 85025 COMPLETE CBC W/AUTO DIFF WBC: CPT

## 2019-03-18 PROCEDURE — 83550 IRON BINDING TEST: CPT

## 2019-03-18 PROCEDURE — 30233N1 TRANSFUSION OF NONAUTOLOGOUS RED BLOOD CELLS INTO PERIPHERAL VEIN, PERCUTANEOUS APPROACH: ICD-10-PCS | Performed by: INTERNAL MEDICINE

## 2019-03-18 PROCEDURE — 86850 RBC ANTIBODY SCREEN: CPT

## 2019-03-18 PROCEDURE — 93005 ELECTROCARDIOGRAM TRACING: CPT

## 2019-03-18 PROCEDURE — C9113 INJ PANTOPRAZOLE SODIUM, VIA: HCPCS

## 2019-03-18 PROCEDURE — 82607 VITAMIN B-12: CPT

## 2019-03-18 PROCEDURE — 83735 ASSAY OF MAGNESIUM: CPT

## 2019-03-18 PROCEDURE — 82728 ASSAY OF FERRITIN: CPT

## 2019-03-18 PROCEDURE — 96374 THER/PROPH/DIAG INJ IV PUSH: CPT

## 2019-03-19 LAB
ANION GAP SERPL CALC-SCNC: 12 MMOL/L (ref 10–20)
BASOPHILS # BLD AUTO: 0 THOU/UL (ref 0–0.2)
BASOPHILS NFR BLD AUTO: 0.4 % (ref 0–1)
BUN SERPL-MCNC: 17 MG/DL (ref 9.8–20.1)
CALCIUM SERPL-MCNC: 8.8 MG/DL (ref 7.8–10.44)
CHLORIDE SERPL-SCNC: 110 MMOL/L (ref 98–107)
CO2 SERPL-SCNC: 23 MMOL/L (ref 23–31)
CREAT CL PREDICTED SERPL C-G-VRATE: 103 ML/MIN (ref 70–130)
EOSINOPHIL # BLD AUTO: 0.1 THOU/UL (ref 0–0.7)
EOSINOPHIL NFR BLD AUTO: 1.4 % (ref 0–10)
GLUCOSE SERPL-MCNC: 98 MG/DL (ref 83–110)
HGB BLD-MCNC: 7.5 G/DL (ref 12–16)
HGB BLD-MCNC: 7.7 G/DL (ref 12–16)
IRON SERPL-MCNC: 36 UG/DL (ref 50–170)
LYMPHOCYTES # BLD: 0.8 THOU/UL (ref 1.2–3.4)
LYMPHOCYTES NFR BLD AUTO: 12.4 % (ref 21–51)
MCH RBC QN AUTO: 28.8 PG (ref 27–31)
MCV RBC AUTO: 94 FL (ref 78–98)
MONOCYTES # BLD AUTO: 0.6 THOU/UL (ref 0.11–0.59)
MONOCYTES NFR BLD AUTO: 8.7 % (ref 0–10)
NEUTROPHILS # BLD AUTO: 5.2 THOU/UL (ref 1.4–6.5)
NEUTROPHILS NFR BLD AUTO: 77.3 % (ref 42–75)
PLATELET # BLD AUTO: 172 THOU/UL (ref 130–400)
POTASSIUM SERPL-SCNC: 4.3 MMOL/L (ref 3.5–5.1)
RBC # BLD AUTO: 2.67 MILL/UL (ref 4.2–5.4)
RETICS/RBC NFR: 11 % (ref 0.5–1.5)
SODIUM SERPL-SCNC: 141 MMOL/L (ref 136–145)
UIBC SERPL-MCNC: 265 MCG/DL (ref 265–497)
WBC # BLD AUTO: 6.7 THOU/UL (ref 4.8–10.8)

## 2019-03-19 NOTE — PDOC.PN
- Subjective


Encounter Start Date: 03/19/19


Encounter Start Time: 16:20





Patient seen and examined for Anemia. Had dark stool earlier. No N/V/abd pain. 

No new complaints. No overnight events





- Objective


MAR Reviewed: Yes


Vital Signs & Weight: 


 Vital Signs (12 hours)











  Temp Pulse Resp BP Pulse Ox


 


 03/19/19 08:00  98.3 F  58 L  18  165/69 H  94 L








 Weight











Weight                         259 lb 11.2 oz














I&O: 


 











 03/18/19 03/19/19 03/20/19





 06:59 06:59 06:59


 


Intake Total  1635 850


 


Balance  1635 850











Result Diagrams: 


 03/19/19 13:54





 03/19/19 09:03





Phys Exam





- Physical Examination


Constitutional: NAD


Respiratory: no wheezing, no rhonchi


Cardiovascular: RRR, no rub


Gastrointestinal: soft, non-tender, positive bowel sounds


Musculoskeletal: no edema


Neurological: moves all 4 limbs





Dx/Plan





- Plan


DVT proph w/SCDs





1. Anemia - ?etio - Prob chronic GI blood loss s/p 2 units PRBC


2. Chronic Anemia


3. Diverticulosis


4. Chronic Venous thromboembolism - on Eliquis


5. Morbid Obesity BMI 43.2


6. HTN


7. HLD


8. Depression - mild - stable


7. CKD 2





PLAN:


Transfuse PRN to maintain HH >7


Hold Eliquis


IV Protonix


Monitor HH


Consult GI


home meds verified and updated


DC Losartan (Patient does not take this)


Resume selected home meds








Review of Systems





- Review of Systems


Respiratory: negative: Cough, Dry, Shortness of Breath, Hemoptysis, SOB with 

Excertion, Pleuritic Pain, Sputum, Wheezing


Cardiovascular: negative: chest pain, palpitations, orthopnea, paroxysmal 

nocturnal dyspnea, edema, light headedness, other





- Medications/Allergies


Allergies/Adverse Reactions: 


 Allergies











Allergy/AdvReac Type Severity Reaction Status Date / Time


 


phenazopyridine HCl Allergy Severe Rash Verified 03/18/19 22:47





[From Azo]     


 


amoxicillin Allergy  Nausea Verified 03/18/19 22:47


 


cephalexin monohydrate Allergy  Rash Verified 03/18/19 22:47





[From Keflex]     


 


Penicillins Allergy  Rash Verified 03/18/19 22:47


 


Sulfa (Sulfonamide Allergy  Rash Verified 03/18/19 22:47





Antibiotics)     


 


aspirin AdvReac   Verified 03/18/19 22:47


 


paper tape Allergy  Rash Uncoded 03/18/19 22:47











Medications: 


 Current Medications





Apixaban (Eliquis)  2.5 mg PO BID [facility]


   Last Admin: 03/19/19 09:03 Dose:  2.5 mg


Atorvastatin Calcium (Lipitor)  10 mg PO HS [facility]


Colestipol HCl (Colestid)  1 gm PO DAILY [facility]


Cyanocobalamin (Vitamin B-12)  1,000 mcg PO DAILY [facility]


Cyanocobalamin (Vitamin B-12)  1,000 mcg PO ONE [facility]


Folic Acid (Folvite)  1 mg PO ONE [facility]


Folic Acid (Folvite)  1 mg PO DAILY [facility]


Hydrochlorothiazide (Hydrochlorothiazide)  12.5 mg PO DAILY [facility]


   Last Admin: 03/19/19 09:04 Dose:  12.5 mg


Levothyroxine Sodium (Synthroid)  25 mcg PO 0600 [facility]


   Last Admin: 03/19/19 05:51 Dose:  25 mcg


Levothyroxine Sodium (Synthroid)  112 mcg PO 0600 [facility]


   Last Admin: 03/19/19 05:51 Dose:  112 mcg


Multivitamins (Theragran)  1 tab PO DAILY [facility]


Non-Formulary Medication (Sertraline Hcl [Sertraline Hcl])  2 tab PO DAILY [facility]


Ondansetron HCl (Zofran)  4 mg IVP Q6H PRN


   PRN Reason: Nausea/Vomiting


Pantoprazole Sodium (Protonix)  40 mg IVP Q12HR [facility]


Pantoprazole Sodium (Protonix)  40 mg IVP NOW [facility]


   Stop: 03/19/19 18:45


Sertraline HCl (Zoloft)  50 mg PO ONE [facility]


Sodium Chloride (Flush - Normal Saline)  10 ml IVF Q12HR PRN


   PRN Reason: Saline Flush


Sodium Chloride (Normal Saline Pf)  10 ml FS PRN PRN


   PRN Reason: RECONSTITUTION


Tramadol HCl (Ultram)  50 mg PO TID PRN


   PRN Reason: Pain

## 2019-03-19 NOTE — HP
CHIEF COMPLAINT:  Sent in by evaluation for abnormal hemoglobin.



HISTORY OF PRESENT ILLNESS:  This is a 78-year-old female complaining of low

hemoglobin, had lab work done and was sent to the hospital.  Denies any nausea,

vomiting, diarrhea, constipation, chest pain, fevers, chills, or shortness of

breath.  Reports that she has had a history of hemorrhoids and bleeding

significantly in the past and has refused to receive blood transfusions in the past.

 The patient at this point in time, does state that she had some bowel movements

which were bloody in nature, but she did not report these to the PCP nor the ER

doctor.  The patient otherwise has no other complaints.  No alleviating or

aggravating factors.  The patient is seen and examined in the patient's room.  All

questions answered. 



REVIEW OF SYSTEMS:  All systems reviewed.  Pertinent positives in HPI, otherwise

negative. 



PAST MEDICAL HISTORY:  Positive for hemorrhoids, DVT, heart murmur, hypertension,

anemia, and diverticulitis. 



SOCIAL HISTORY:  The patient used to smoke, however, quit a few years back and is a

social drinker.  Lives with her family. 



MEDICATIONS:  See MAR.



FAMILY HISTORY:  Not contributory.



PHYSICAL EXAMINATION:

VITAL SIGNS:  Blood pressure 166/52, heart rate of 81, respiratory rate of 17, O2

saturation of 99% on room air, and temperature of 98.3. 

GENERAL:  The patient lying comfortably in bed with no complaints. 

HEENT:  Pupils are equal, round, and reactive to light and accommodation.

Extraocular muscles intact.  Oral cavity appears moist and pink. 

NECK:  Supple, mobile, nontender.  Thyroid appreciated. 

RESPIRATORY:  Clear to auscultation bilaterally.  No increase in AP diameter. 

CARDIOVASCULAR:  Regular rate and rhythm.  S1 and S2.  2/6 systolic ejection murmur

appreciated. 

ABDOMEN:  Positive bowel sounds.  Soft, nontender. 

EXTREMITIES:  Trace pitting edema.  2+ peripheral pulses. 

NEUROLOGICAL:  Cranial nerves 2 through 12 intact.  Alert and oriented x3.  No loss

of sensory function. 



LABORATORY DATA:  The patient had a CBC done which shows hemoglobin of 6, otherwise

normal.  Basic metabolic panel relatively normal. 



ASSESSMENT:  

1. Low hemoglobin.

2. History of hemorrhoids.

3. History of deep venous thrombosis.

4. Systolic ejection murmur.

5. Hypertension.



PLAN:  

1. At this point in time, we will admit the patient to the inpatient unit.  Continue

apixaban as no active bleeding source noted outside of the two bloody bowel

movements that the patient had a few days ago.  Currently, the patient states that

she does not have that. 

2. The patient is pending IV blood transfusion.

3. We will obtain iron studies and iron saturation as well to make sure that there

is no role of iron deficiency.  The patient likely will need IV iron if there is any

role of iron deficiency. 

4. Hemoglobin at this point is 6.  Transfusion pending.  Vital signs stable.

5. The patient wishes to remain a full code.

6. Continue home medications when medical reconciliation has been performed. 

Case and plan discussed with the patient at length.  She understood and agreed with

this plan. 







Job ID:  [id number]

## 2019-03-20 VITALS — DIASTOLIC BLOOD PRESSURE: 71 MMHG | TEMPERATURE: 98.3 F | SYSTOLIC BLOOD PRESSURE: 155 MMHG

## 2019-03-20 LAB
ANION GAP SERPL CALC-SCNC: 10 MMOL/L (ref 10–20)
BASOPHILS # BLD AUTO: 0 THOU/UL (ref 0–0.2)
BASOPHILS NFR BLD AUTO: 0.2 % (ref 0–1)
BUN SERPL-MCNC: 14 MG/DL (ref 9.8–20.1)
CALCIUM SERPL-MCNC: 8.5 MG/DL (ref 7.8–10.44)
CHLORIDE SERPL-SCNC: 108 MMOL/L (ref 98–107)
CO2 SERPL-SCNC: 27 MMOL/L (ref 23–31)
CREAT CL PREDICTED SERPL C-G-VRATE: 109 ML/MIN (ref 70–130)
EOSINOPHIL # BLD AUTO: 0.1 THOU/UL (ref 0–0.7)
EOSINOPHIL NFR BLD AUTO: 2.7 % (ref 0–10)
GLUCOSE SERPL-MCNC: 101 MG/DL (ref 83–110)
HGB BLD-MCNC: 7.1 G/DL (ref 12–16)
LYMPHOCYTES # BLD: 0.8 THOU/UL (ref 1.2–3.4)
LYMPHOCYTES NFR BLD AUTO: 15 % (ref 21–51)
MAGNESIUM SERPL-MCNC: 1.9 MG/DL (ref 1.6–2.6)
MCH RBC QN AUTO: 28.9 PG (ref 27–31)
MCV RBC AUTO: 94.2 FL (ref 78–98)
MONOCYTES # BLD AUTO: 0.6 THOU/UL (ref 0.11–0.59)
MONOCYTES NFR BLD AUTO: 11.6 % (ref 0–10)
NEUTROPHILS # BLD AUTO: 3.8 THOU/UL (ref 1.4–6.5)
NEUTROPHILS NFR BLD AUTO: 70.5 % (ref 42–75)
PLATELET # BLD AUTO: 156 THOU/UL (ref 130–400)
POTASSIUM SERPL-SCNC: 3.9 MMOL/L (ref 3.5–5.1)
RBC # BLD AUTO: 2.47 MILL/UL (ref 4.2–5.4)
SODIUM SERPL-SCNC: 141 MMOL/L (ref 136–145)
VIT B12 SERPL-MCNC: 644 PG/ML (ref 211–911)
WBC # BLD AUTO: 5.5 THOU/UL (ref 4.8–10.8)

## 2019-03-20 NOTE — CON
DATE OF CONSULTATION:  03/19/2019



REASON FOR CONSULT:  Anemia and history of rectal bleeding.



HISTORY OF PRESENT ILLNESS:  Ms. Coates is a 78-year-old female with long history

of anemia, long history of rectal bleeding. Previous evaluations include upper

endoscopy in 12/2016 which showed a hiatal hernia, but was otherwise normal. She had

EGD and colonoscopy in 06/2015, which showed a hiatal hernia and diverticulosis

coli. No other signs of overt bleeding. She had a capsule endoscopy of the small

bowel in January 2017 which showed few areas of lymphangiectasias, small lymphoid

aggregates, but no overt bleeding. She was ultimately referred for IV iron infusions

and she was on continuous Eliquis for history of DVTs and pulmonary embolus. She had

documented iron deficiency. She did undergo a hemorrhoidectomy last year for ongoing

rectal bleeding. She reports that she was sent to the hospital for transfusion by

her primary physician where her hemoglobin was in the range of 5 to 6. She denies

any overt bleeding; she does state she had a dark stool, but has not seen any overt

blood. In fact, here, she has had a stool specimen sent that was Hemoccult negative.

The H and P on admission notes that she did have a little bit of blood in her stool

a few times apparently a week or 2 before admission, but did not report this to her

primary doctor or the ER doctor, and when I asked her about these stools, she said

they were bright red on the tissue and some water in the bowl. Presently, she states

her stools are very dark. 



REVIEW OF SYSTEMS:  Denies any chest pain, shortness of breath, dyspnea on exertion.

 Her daughter who is at the bedtime notes that she is much stronger than when she

originally came in. She has some chronic abdominal cramping on and off, but she has

had no overt severe abdominal pain. 



PAST MEDICAL HISTORY:  Hemorrhoids, DVT, heart murmur, hypertension, anemia, and

diverticulitis. She has also had pulmonary embolus in the past. 



SOCIAL HISTORY:  Prior smoking, she stopped. She is a social drinker, but does not

drink much now. She lives with family. 



MEDICATIONS:  At home:

1. Colestipol was recently started for diarrhea.

2. Tramadol.

3. Sertraline.

4. Levothyroxine.

5. Hydrochlorothiazide.

6. Iron.

7. Eliquis.

8. Lipitor. 

Presently:

1. Eliquis.

2. Lipitor.

3. Colestid.

4. Vitamin B12.

5. Folic acid.

6. Synthroid.

7. Multivitamin.

8. Protonix.

9. Tramadol.



PHYSICAL EXAMINATION:

GENERAL: The patient is resting comfortably in bed. 

VITAL SIGNS: Temperature is 98, pulse 58, blood pressure 165/69. 

LUNGS: Clear. 

HEART: Regular rate and rhythm without murmurs. 

ABDOMEN: Soft and nontender. No rebound or guarding. 

EXTREMITIES: No clubbing, cyanosis, or edema. 

RECTAL: Stool is black with thick color from iron discoloration, no overt melena.



LABORATORY STUDIES:  BUN and creatinine were 17 and 0.84.  today; 21 and 0.86

yesterday.  __________. Liver function tests normal. Iron saturation 14% with a

serum iron of 36, TIBC of 269, and ferritin of 106 on admission. After transfusion,

her hemoglobin is 7.7 earlier this morning and 7.5 this afternoon. 



ASSESSMENT:  

1. Chronic anemia, likely related to rectal bleeding on and off, likely exacerbated

by Eliquis. She has had multiple endoscopies in 2015 and 2016 and a capsule

endoscopy in January 2017. With these chronic issues, she had a hemorrhoidectomy

last year. At this time, I do not see a role for repeating her endoscopy for these

chronic problems has been worked up aggressively in the past. I think a lot of her

bleeding is to do with her anticoagulation, which is necessary and she needs to

remain on. I agree with transfusing her with heme-negative stool today, not

embarking on endoscopy and considering IV iron supplementation in the outpatient

setting. If there are further issues or signs of acute GI bleeding or positive

hemoccult stools, we can reconsider endoscopy. 

2. With regard to her Colestid, I think that is reasonable to use for her chronic

diarrhea that she has after her cholecystectomy, that would be best to be used at

least 2 hours away from other medications as it will bind up other medicines and

prevent them from being absorbed and working. 







Job ID:  [id number]

## 2019-03-20 NOTE — DIS
DATE OF ADMISSION:  03/18/2019



DATE OF DISCHARGE:  03/20/2019



DISCHARGE DISPOSITION:  Home.



FOLLOWUP:  

1. Follow up with primary care physician, Dr. Crenshaw in 1 week.

[facility].

3. Repeat CBC next week is recommended.  Primary care physician advised to follow.



DISCHARGE MEDICATIONS:  

1. Protonix 40 mg daily.

2. Multivitamin one tablet daily.

3. All other home medications including and Eliquis were left unchanged.



BRIEF HOSPITAL COURSE:  The patient is a 78-year-old female with anemia, history of

thromboembolism, on anticoagulation, presented to the emergency room with abnormal

labs.  She was found to have hemoglobin of 5.5 as an outpatient.  She felt generally

weak along with dizziness and some shortness of breath.  Please refer to the history

and physical for further details. 



The patient was admitted to the hospital with a diagnosis of symptomatic anemia.

She received 2 units of blood transfusion on the day of admission.  Her hemoglobin

initially improved to 7.7.  This morning, her counts were 7.1.  She received 1 more

unit of PRBC today.  Vitamin B12 was 644, folic acid was 9.9, reticulocyte was 11,

iron was 36, TIBC 265, saturation was 14 with ferritin of 107.  Troponin was

negative.  The patient was evaluated by Gastroenterology, Dr. Zhang.  Dr. Zhang has

cleared her for discharge.  She has recurrent intermittent rectal bleeding most

likely from residual hemorrhoids and being on Eliquis per GI.  She was advised to

continue oral supplementation with intermittent [facility] infusion [facility].

I discussed the case with Laila Golden, who will follow her up as an outpatient. 



FINAL DIAGNOSES:  

1. Symptomatic anemia, status post 3 units of PRBC.

2. Recurrent lower gastrointestinal bleeding, probably is due to hemorrhoids.  No

intervention needed per GI. 

3. Chronic venous thromboembolism, on Eliquis.

4. Diverticulosis.

5. Chronic anemia.

6. Morbid obesity with BMI of 43.2.

7. Hypertension.

8. Hyperlipidemia.

9. Chronic kidney disease stage 2.

10. Depression, mild, stable.

11. Hypothyroidism.



PLAN:  Plan of care was discussed with the patient and daughter at the bedside.

They stated understanding. 







Job ID:  [id number]

## 2019-03-20 NOTE — PRG
DATE OF SERVICE:  03/20/2019



SUBJECTIVE:  The patient feels much better after having had 3 units of RBC

transfusion.  She has no nausea, vomiting, or abdominal pain. 



OBJECTIVE:  VITAL SIGNS:  Temperature is 97.9, blood pressure 156/72, and pulse of

62. 

GENERAL:  She is alert, conversant, sitting up at the bedside. 

HEENT:  Shows anicteric sclerae. 

NECK:  Supple. 

CARDIOVASCULAR:  Shows normal S1 and S2.  Regular rate and rhythm. 

CHEST:  Shows breath sounds.  No adventitious sounds. 

ABDOMEN:  Protuberant, but soft.  Good bowel sounds.  No tenderness.  No tympany.



LABORATORY DATA:  Hemoglobin of 7.1 after 2 units of RBC transfusion from 6.0.

Electrolytes within normal range.  Creatinine 0.79, BUN of 14.  Ferritin level of

106.  Vitamin B12 of 644.  Folic acid 9.9.  Stool occult blood was negative. 



ASSESSMENT:  Severe symptomatic anemia, much improved after 2 units of RBC

transfusion.  The patient has had intermittent rectal bleeding, which I suspect

likely from residual hemorrhoids and being on Eliquis.  She has had extensive GI

evaluation 2 years ago with upper and lower endoscopy and small bowel capsule

endoscopy. 



RECOMMENDATIONS:  

1. At this point, I do not see any utility in repeating her GI tract evaluation as

it was negative from 2 years ago.  She does have recurrent intermittent rectal

bleeding most likely from residual hemorrhoids and being on Eliquis. 

2. The patient can be discharged to home from GI standpoint.

3. Continue oral iron supplement, if this is not enough, may need more frequent

intravenous iron transfusion. 







Job ID:  [id number]

## 2019-04-11 ENCOUNTER — HOSPITAL ENCOUNTER (OUTPATIENT)
Dept: HOSPITAL 9 - MADLAB | Age: 79
Discharge: HOME | End: 2019-04-11
Payer: MEDICARE

## 2019-04-11 DIAGNOSIS — D50.0: Primary | ICD-10-CM

## 2019-04-11 LAB
HGB BLD-MCNC: 11.6 G/DL (ref 12–16)
MCH RBC QN AUTO: 26.4 PG (ref 27–31)
MCV RBC AUTO: 88.3 FL (ref 78–98)
PLATELET # BLD AUTO: 199 THOU/UL (ref 130–400)
RBC # BLD AUTO: 4.37 MILL/UL (ref 4.2–5.4)
WBC # BLD AUTO: 7.4 THOU/UL (ref 4.8–10.8)

## 2019-04-11 PROCEDURE — 85027 COMPLETE CBC AUTOMATED: CPT

## 2019-04-11 PROCEDURE — 36415 COLL VENOUS BLD VENIPUNCTURE: CPT

## 2019-06-04 ENCOUNTER — HOSPITAL ENCOUNTER (OUTPATIENT)
Dept: HOSPITAL 9 - MADLAB | Age: 79
Discharge: HOME | End: 2019-06-04
Payer: MEDICARE

## 2019-06-04 DIAGNOSIS — D50.0: Primary | ICD-10-CM

## 2019-06-04 LAB
HGB BLD-MCNC: 11.7 G/DL (ref 12–16)
MCH RBC QN AUTO: 26.6 PG (ref 27–31)
MCV RBC AUTO: 85.8 FL (ref 78–98)
PLATELET # BLD AUTO: 204 THOU/UL (ref 130–400)
RBC # BLD AUTO: 4.41 MILL/UL (ref 4.2–5.4)
WBC # BLD AUTO: 8.5 THOU/UL (ref 4.8–10.8)

## 2019-06-04 PROCEDURE — 36415 COLL VENOUS BLD VENIPUNCTURE: CPT

## 2019-06-04 PROCEDURE — 85027 COMPLETE CBC AUTOMATED: CPT

## 2019-11-26 NOTE — RAD
EXAM:

Chest Two Views



11/26/2019 1:43 PM



HISTORY:

Chest pain



COMPARISON:

. Third 2019



FINDINGS:

Heart: Stable cardiomegaly



Pulmonary vessels: Normal.



Costophrenic angles: Clear.



Lungs: No acute airspace consolidation.



Pneumothorax: None.



Osseous structures:Intact.



Additional findings:   Stable large hiatal hernia



IMPRESSION:

No significant acute intrathoracic disease.



Reported By: Tarik Alexander 

Electronically Signed:  11/26/2019 1:43 PM

## 2019-11-26 NOTE — ULT
LEFT LOWER EXTREMITY DOPPLER VENOUS ULTRASOUND



PROVIDED CLINICAL HISTORY:

History of DVT with chest pain



TECHNIQUE:

Grayscale and color Doppler sonography with spectral analysis was performed of the left common femora
l, femoral, popliteal, posterior tibial, greater saphenous and profunda femoral veins.



FINDINGS: There is normal compression, flow and augmentation seen within the deep venous structures o
f the left lower extremity.



IMPRESSION:

No sonographic evidence for left lower extremity deep venous thrombosis.



Reported By: Tarik Alexander 

Electronically Signed:  11/26/2019 2:05 PM

## 2019-11-29 NOTE — EKG
Test Reason : 

Blood Pressure : ***/*** mmHG

Vent. Rate : 059 BPM     Atrial Rate : 059 BPM

   P-R Int : 184 ms          QRS Dur : 136 ms

    QT Int : 494 ms       P-R-T Axes : 000 198 047 degrees

   QTc Int : 489 ms

 

*** Suspect arm lead reversal, interpretation assumes no reversal

Sinus bradycardia

Non-specific intra-ventricular conduction block

Inferior infarct , age undetermined

Anterolateral infarct , age undetermined

Abnormal ECG

 

Confirmed by DARLIN LEONARDO, BARBARA (12),  BHAVNA QUINTANILLA ([id number]) on 11/29/2019 4:13:06 PM

 

Referred By:             Confirmed By:BARBARA SAEED MD

## 2020-02-15 NOTE — CT
Exam: Abdomen CT without contrast

Pelvic CT without contrast



HISTORY: Abdominal pain.



COMPARISON: 2/2/2019



FINDINGS:

Abdomen CT:

Lung bases:Chronic changes

Heart size: Normal heart size

Aorta: Atherosclerosis of a nonaneurysmal aorta

Solid organs: Limited evaluation of the lack of IV contrast. Stable hypodensity in the liver, compati
ble with 1.9 cm cyst. Spleen and pancreas have appropriate attenuation

Hypoattenuation of the right adrenal gland with attenuation coefficient of 3 Hounsfield units, compat
ible with an adenoma. Hypoattenuation of the left adrenal gland with attenuation coefficient of -6

Hounsfield units is also compatible with adenoma. 

Lymph nodes: No gastrohepatic, retrocrural or periportal lymphadenopathy

Gallbladder: Surgically absent

Mesentery: No mass, lymphadenopathy, free air or free fluid line anterior abdominal wall: Redemonstra
tion of ventral abdominal wall hernia containing mesenteric fat. No evidence of bowel herniation.

Kidneys: Nonobstructing punctate calculi in the right renal pelvis. Bilaterally no hydronephrosis or 
perinephric fat stranding. Bilateral ureters have a normal caliber. No hydroureter, periureteral

fat stranding or ureterolithiasis. Previously noted calculus in the proximal right ureter appears to 
have passed and is no longer present in the ureter.

Alimentary canal: Limited evaluation by the lack of oral contrast. No evidence of bowel obstruction. 
Extensive diverticulosis, without evidence of diverticulitis. Appendix is not appreciated. Large

hiatal hernia is redemonstrated.





CT PELVIS: 

No mass, adenopathy, free air or free fluid.  Surgically absent uterus.

Urinary bladder: No evidence of a bladder calculus.

Osseous structures: Chronic changes.



IMPRESSION:

Interval passage of a previously noted proximal right ureteral calculus. Currently, no evidence of ob
structive uropathy.



Transcribed Date/Time: 2/15/2020 11:53 PM



Reported By: Farhana Leon 

Electronically Signed:  2/16/2020 12:03 AM

## 2020-02-19 NOTE — RAD
EXAM:

Chest one view:



HISTORY:

Pain



COMPARISON:

11/26/2019



FINDINGS:

Large hiatal hernia

Heart size: Moderate cardiomegaly.

Lungs: Clear of acute process.

No evidence for confluent pneumonia, pleural effusion, acute edema, or pneumothorax, or other signifi
cant acute process.



IMPRESSION:

No significant acute intrathoracic disease.

Stable large hiatal hernia.

Atherosclerosis of the aorta.



Reported By: Gary Hickey 

Electronically Signed:  2/19/2020 4:29 PM

## 2020-02-24 NOTE — CT
CT LUMBAR SPINE WITHOUT CONTRAST:

 

Comparison: 2-

 

History: Intractable low back pain. 

 

Technique: Multiple contiguous axial images were obtained in a CT of the lumbar spine without contras
t. Sagittal and coronal reformats were performed. 

 

FINDINGS: 

There is a wedge compression deformity of the L2 vertebral body. This was not seen on the prior exami
nation and has developed in the interim. This predominately involves the superior endplate of the kamron
tebral body. This demonstrates approximately 25% height loss. Vertebral bodies demonstrate normal ali
gnment without subluxation. Degenerative changes are seen throughout the cervical spine. These are mo
re prominent in the lower thoracic spine and at the thoracolumbar junction. 

 

Subtle retropulsion of the superior endplate of the L2 vertebral body in the central canal is seen ca
using mild central canal stenosis. No bony narrowing of the upper lumbar neural foramina are seen. Th
ere is moderate bilateral bony narrowing of the neural foramina at L5-S1, right greater than left. 

 

There is a moderate hiatal hernia. There are bilateral adrenal masses which demonstrate Hounsfield un
it values consistent with fat containing adrenal adenomas. Scattered diverticula are seen in the colo
n. The other prevertebral and paraspinal soft tissues are unremarkable. 

 

IMPRESSION: 

1. Interval development of L2 compression deformity. This may be cause of the patient's acute back pa
in. 

2. Diverticulosis. 

3. Bilateral fat containing adrenal adenomas. 

 

POS: [facility]

## 2020-02-25 NOTE — HP
REASON FOR ADMISSION:  Intractable low back pain and general weakness.



HISTORY OF THE PRESENT ILLNESS AND HOSPITAL COURSE:  Ms. Coates is a very pleasant

79-year-old  female with history of hypertension, hypothyroidism,

depression, anxiety, and dyslipidemia.  Family reports that the patient has been

notably depressed since her spouse's death about three months ago.  Her SSRI was

recently adjusted in the clinic, but does not seem to be helping much.  Lately, she

had been complaining of low back pain that has progressively worsened over the past

couple of weeks.  The patient has been in the ER couple times this past week

secondary to the pain.  She was initially seen on 02/15/2020, at [facility] due to

reported abdominal pain but actually referring to the low back pain.  CT of the

pelvis and abdomen at that time was unremarkable.  The patient was sent home with a

diagnosis of abdominal right lower quadrant pain and right flank pain.  She was

given tramadol that she took for few days.  Family reports that the patient's

tramadol did not help much at all.  So, the patient went back on 02/19/2020, when

she completed her three-day course of tramadol.  On her second ER visit, she was

diagnosed with UTI and was started on Levaquin that she took for 4 days.  Per

family, the patient had completed the oral antibiotic, but does not help much.  The

patient still complains of debilitating pain reported at 10/10 in intensity.  Family

reports that she has not been moving much at all as she could hardly get up from her

chair or bed.  The patient could not tolerate going to the bathroom at all, and she

pees and defecates in her underwear, making it hard for the family to take care of

the patient at this time at home.  Per family, the patient's home health therapies,

both PT and OT, were already canceled as the patient would not cooperate at all.

She was initially seen in the clinic today.  When seen, she was wheelchair bound,

awake, alert, and able to express her concerns.  She admits that she is definitely

depressed but trying to move on her own.  There was no significant fall, trauma, or

injury reported lately.  She is voiding freely, afebrile, eating with good urine

output. 



PAST MEDICAL HISTORY:  Hyperlipidemia; hypertension; history of breast cancer,

treated with surgery; history of pulmonary embolism, on long-term use of

anticoagulation/Eliquis; history of PE and DVT, recurrent, on long-term use of

Eliquis; hypothyroidism; 

anemia; and diverticulitis.



PAST SURGICAL HISTORY:  Appendectomy, cholecystectomy, renal stent,

hemorrhoidectomy, hysterectomy, mastectomy of the left breast, lumpectomy of the

right side, left shoulder surgery, thyroidectomy, tonsillectomy, left ankle broken

surgery, hernia repair, and skin cancer removal. 



SOCIAL HISTORY:  The patient is .  She lives with her daughter, Ailyn, who is

the primary caretaker.  She denies smoking, illicit drug use, or alcohol use. 



FAMILY HISTORY:  Noncontributory.



ALLERGIES:  

1. TO AMOXICILLIN, ASPIRIN, CEPHALEXIN, CODEINE, BUT COULD TOLERATE HYDROCODONE PER

DAUGHTER. 

2. NSAIDS, PENICILLIN, AND PHENAZOPYRIDINE (RASH).

3. SULFA DRUGS, RASH.



CURRENT MEDICATIONS:  

1. Levothyroxine 137 mcg p.o. q.a.m.

2. Hydrochlorothiazide 12.5 mg p.o. daily.

3. Iron 325 mg two b.i.d.

4. Eliquis 2.5 mg p.o. b.i.d.

5. Colestipol 2 g daily.

6. Multivitamins one tablet p.o. daily.

7. Atorvastatin 20 mg p.o. at bedtime.



REVIEW OF SYSTEMS:  GENERAL:  Denies fever or chills.  Reports general weakness,

fatigue, and nocturnal pain. 

HEENT:  No acute visual changes or hearing changes. 

RESPIRATORY:  No shortness of breath, wheezing, chronic cough, sputum production, or

bloody sputum. 

GI:  No nausea, vomiting, or abdominal pain.  Reports chronic diarrhea now with

constipation.  Denies rectal bleeding, melena, hematemesis, or hematochezia. 

GENITOURINARY:  No dysuria, hematuria, frequency, or urgency.  Reports incontinence. 

MUSCULOSKELETAL:  Reports low back pain.  Denies joint effusion or swelling. 

NEURO:  No focal numbness, focal weakness, seizure, tics, or tremors. 

PSYCH:  Reports depression and anxiety.  Denies hallucinations, suicidal thoughts,

ideations, or plans. 

SKIN:  No rashes.  No lesions.



PHYSICAL EXAMINATION:

VITAL SIGNS:  Blood pressure 197/79, temperature 98.1, pulse 55, respirations 20,

and O2 sats 95%.  Weight 237 pounds.  Height 5 feet 5 inches. 

GENERAL:  The patient is awake, alert, and oriented x3, uncomfortable secondary to

pain, lying in bed, not in acute distress.  Daughter, Ailyn and son were present at

the time of examination. 

HEENT:  Normocephalic, atraumatic.  PERRL.  Anicteric sclerae.  Oral mucosa is

moist.  Tongue at midline.  No tremors.  No oral lesions. 

NECK:  Supple.  Full range of motion.  No swelling.  Flat JVD.  No bruit. 

CHEST:  Normal excursion.  Clear to auscultation bilaterally. 

CARDIAC:  RRR.  Normal S1 and S2. 

ABDOMEN:  Obese and soft.  Normoactive bowel sounds.  Nondistended, nontender.

Negative CVA tenderness bilaterally. 

EXTREMITIES:  No edema.  No cyanosis. 

BACK:  No significant swelling or evidence of scoliosis.  Localized tenderness in

the lumbar paraspinals with focal muscle tenderness in lumbar paraspinals. 

SKIN:  Intact.  No rash.  No petechiae.  No purpura. 

PSYCH:  Good eye contact.  Appropriate mood and affect.  Nonsuicidal.



ASSESSMENT AND PLAN:  

1. Intractable low back pain.

2. Elevated blood pressure.

3. Lumbar muscle strain.

4. Hypertension; hypothyroidism; dyslipidemia; and history of pulmonary embolism and

deep venous thrombosis, on long-term use of Eliquis for anticoagulation. 

5. Dyslipidemia.

6. Depression and anxiety.

7. Deconditioning secondary to general weakness. 

The patient is admitted to [facility] for pain management and possible

inpatient therapy.  We will continue all current medications as modified per list.

We will add narcotic and muscle relaxants for pain as part of pain management.  We

will start on Cymbalta to cover for both depression and pain control. 



Of note, the patient reports history of codeine allergy but daughterAilyn, who

acts as a caretaker reports previous history of Norco use with no significant side

effects. 



X-ray of the lumbar and sacrum was ordered.  Labs including CBC, CMP, and TSH were

ordered. 



Gastrointestinal prophylaxis with PPI.  Deep venous thrombosis prophylaxis not

needed secondary to current use of oral anticoagulant.  Further recommendations

depending on the hospital course.  Code status, the patient reports DNAR in front of

her family.  DaughterAilyn, who acts as the MPOA/surrogate decision maker for

patient agrees with patient's wishes. 







Job ID:  [id number]

## 2020-03-01 NOTE — CON
DATE OF CONSULTATION:  03/01/2020



CHIEF COMPLAINT:  Blood in stool.



HISTORY OF PRESENT ILLNESS:  Ms. Coates is a 79-year-old woman, who was admitted to

the hospital and then transferred to rehab a couple of weeks ago with a compression

fracture in her spine.  She received tramadol for pain.  She became constipated a

week ago.  She went about a week without a bowel movement despite taking some

Senokot.  Ultimately yesterday morning, she took some ice cream, which did result in

a bowel movement, which was initially normal yesterday morning.  After that, she

passed a large red bloody bowel movement and then was sent to [facility] in [place]

for further management.  She subsequently had multiple red bloody stools with clots.

 Last night, she had a bowel movement every couple of hours with blood and clots and

then to the morning.  This afternoon, the bleeding has slowed down.  She has had no

nausea or vomiting with this.  No abdominal pain.  She has been confused over the

last couple of weeks mildly and her two daughters are with her.  She has been on

Eliquis due to a history of DVT and pulmonary embolism about 4 years ago.  About 3

years ago, she underwent upper endoscopy and lower endoscopy and capsule endoscopy

for evaluation of anemia and no obvious source was identified.  The daughters report

that she had diverticulosis.  More recently, she has been requiring IV iron

infusions monthly and sometimes weekly.  She has not been having overt GI bleeding

with it. 



PAST MEDICAL HISTORY:  DVT and pulmonary embolism; breast cancer; hyperlipidemia;

hypothyroidism; history of chronic iron deficiency anemia, for which she receives IV

iron and oral iron; compression fracture in her spine. 



PAST SURGICAL HISTORY:  Cholecystectomy, appendectomy, mastectomy, shoulder surgery,

thyroidectomy, tonsillectomy, and ankle surgery. 



FAMILY HISTORY:  Negative for GI malignancy.



SOCIAL HISTORY:  No alcohol, tobacco, or drugs.



ALLERGIES:  AMOXICILLIN, CEPHALEXIN, PENICILLIN, SULFA, ASPIRIN, AZO.  SHE DOES NOT

HAVE A TRUE MILK OR ICE CREAM ALLERGY.  SHE DOES HAVE LACTOSE INTOLERANCE. 



MEDICATIONS:  Include;

1. Atorvastatin.

2. Duloxetine.

3. Cyclobenzaprine.

4. Ferrous gluconate.

5. Hydrochlorothiazide.

6. Levothyroxine.

7. Hydrocodone as needed.

8. Prednisone.

9. Pantoprazole.



REVIEW OF SYSTEMS:  Negative x10 systems reviewed, except as stated in history of

present illness. 



PHYSICAL EXAMINATION:

VITAL SIGNS:  Temperature 97.5, pulse 73, blood pressure 111/57. 

GENERAL:  She is in no acute distress.  She is alert, oriented x3, but does have

some confusion answering questions regarding her medical history. 

HEENT:  Her eyes have no scleral icterus.  Oropharynx is clear without lesions.  No

cervical or supraclavicular lymphadenopathy. 

LUNGS:  Clear to auscultation bilaterally. 

HEART:  Regular rate and rhythm with a 3/6 systolic murmur at the apex. 

ABDOMEN:  Soft, nontender, and nondistended.  Bowel sounds are present. 

EXTREMITIES:  No lower extremity edema. 

RECTAL:  Reveals dark red blood in the rectal vault.



IMPRESSION:  

1. Acute gastrointestinal bleed, most consistent with a diverticular bleed.  This

occurred after a week of constipation and stimulant laxative use.  Ischemic colitis

is consideration, however, she has no abdominal pain to suggest this. 

2. Anemia of acute blood loss.

3. Chronic iron deficiency anemia as well.  She has required IV iron infusions.  Her

last colonoscopy and esophagogastroduodenoscopy have been around 3 years ago now and

she is requiring increasing frequency of iron infusions up to monthly and sometimes

weekly.  I think that endoscopic re-evaluation is appropriate at this point to

evaluate for bleeding source for the acute bleed and also her chronic iron

deficiency anemia. 

4. History of pulmonary embolism, on anticoagulation with Eliquis.  Her last dose of

Eliquis was yesterday morning. 



RECOMMENDATIONS:  EGD and colonoscopy tomorrow.







Job ID:  [id number]

## 2020-03-01 NOTE — HP
CHIEF COMPLAINT:  Lower GI bleed.



HISTORY OF PRESENT ILLNESS:  Ms. Coates is a 79-year-old female with past medical

history of DVT; pulmonary embolism, on Eliquis; hyperlipidemia; breast cancer;

anemia; diverticulitis; hypothyroidism, among others, is [facility] after the patient was found to have a large bloody bowel movement

today.  The patient is being transferred for further management and GI consultation.

 Workup today, the patient's hemoglobin is 11.8.  Creatinine 0.78.  The patient

denies abdominal pain.  She has been having low back pain.  She recently had L2

compression fracture, for which, she was getting rehab and physical therapy.  The

patient is being admitted here for further management. 



PAST MEDICAL HISTORY:  

1. DVT.

2. Pulmonary embolism.

3. Breast cancer.

4. Hyperlipidemia.

5. Hypothyroidism.

6. Anemia.

7. Diverticulitis.

8. Lumbar spine compression fracture.



PAST SURGICAL HISTORY:  

1. Appendectomy.

2. Cholecystectomy.

3. Renal stents.

4. Mastectomy.

5. Left shoulder.

6. Thyroidectomy.

7. Tonsillectomy.

8. Left ankle surgery.

9. Skin cancer removal.



FAMILY HISTORY:  Reviewed and noncontributory.



SOCIAL HISTORY:  Denies smoking, alcohol drinking, or drug abuse.



ALLERGIES:  ALLERGIC TO PENICILLIN, SULFA, KEFLEX, AMOXICILLIN, PHENAZOPYRIDINE.

PLEASE SEE CHART ELSEWHERE FOR COMPLETE LIST OF ALLERGIES. 



REVIEW OF SYSTEMS:  Review of 14 systems negative except what is mentioned in

history of present illness. 



PHYSICAL EXAMINATION:

GENERAL:  The patient is awake, not in acute distress. 

VITAL SIGNS:  Blood pressure 110/80, pulse is 80, respiratory rate is 14,

temperature is 98. 

HEAD AND NECK:  Normocephalic, atraumatic. 

NECK:  Supple.  No JVD. 

CHEST:  Fair bilateral air entry. 

HEART:  S1, S2.  Regular. 

ABDOMEN:  Soft, mildly distended.  Mild lower abdominal tenderness. 

NEUROLOGIC:  Awake, alert, and oriented x3. 

PSYCH:  Normal mood. 

EXTREMITIES:  No clubbing or cyanosis. 

GENITOURINARY:  No suprapubic tenderness.  No flank tenderness. 

MUSCULOSKELETAL:  No apparent deformity or tenderness.



LABORATORY DATA:  As mentioned above in history of present illness.



ASSESSMENT:  

1. Acute gastrointestinal bleeding.

2. Anticoagulated on Eliquis.

3. History of pulmonary embolism.

4. History of deep venous thrombosis.

5. History of diverticulitis.

6. Low back pain secondary to lumbar spine compression fracture.



PLAN:  

1. Admit.

2. Keep n.p.o.

3. IV fluids.

4. IV proton pump inhibitor.

5. Consult GI for evaluation and further recommendations.

6. Hold anticoagulation.

7. Monitor hemoglobin and hematocrit.

8. Type and cross and transfuse if the patient becomes hemodynamically stable or

there is acute drop in her hemoglobin. 

9. Reconcile home medications.

10. DVT prophylaxis as appropriate.

11. Expected length of stay 2 midnights or more.







Job ID:  [id number]

## 2020-03-01 NOTE — PDOC.HOSPP
- Subjective


Encounter Date: 20


Encounter Time: 11:40


Subjective: 


Patient had continued dark maroon blood in stool this AM. No weakness. No 

abdominal pain. No N/V.





- Objective


Vital Signs & Weight: 


 Vital Signs (12 hours)











  Temp Pulse Resp BP Pulse Ox


 


 20 07:22  97.5 F L  73  18  111/57 L  97








 Weight











Weight                         240 lb 6 oz














Result Diagrams: 


 20 14:05





 20 20:15





Hospitalist ROS





- Review of Systems


Constitutional: denies: fever, chills


Respiratory: denies: cough, shortness of breath


Cardiovascular: denies: chest pain, palpitations


Gastrointestinal: reports: hematochezia.  denies: nausea, vomiting, abdominal 

pain





- Medication


Medications: 


Active Medications











Generic Name Dose Route Start Last Admin





  Trade Name Freq  PRN Reason Stop Dose Admin


 


Hydrocodone Bitart/Acetaminophen  1 tab  20 03:29  20 04:00





  [place] 10/325  PO   1 tab





  Q6H PRN   Administration





  Moderate Pain (4-6)   





     





     





     


 


Sodium Chloride  1,000 mls @ 100 mls/hr  20 20:00  20 05:47





  Normal Saline 0.9%  IV   1,000 mls





  .Q10H STACI   Administration





     





     





     





     


 


Pantoprazole Sodium  40 mg  20 21:00  20 21:28





  Protonix  IVP   40 mg





  BID STACI   Administration





     





     





     





     


 


Sodium Chloride  10 ml  20 19:59  20 21:29





  Flush - Normal Saline  IVF   10 ml





  PRN PRN   Administration





  Saline Flush   





     





     





     














- Exam


General Appearance: NAD, awake alert


ENT: moist mucosa


Heart: RRR, no murmur, no gallops, no rubs


Respiratory: CTAB, no wheezes, no rales, no ronchi


Gastrointestinal: soft, non-tender, non-distended, normal bowel sounds


Psychiatric: normal affect, normal behavior, A&O x 3





Hosp A/P


(1) Lower GI bleed


Code(s): K92.2 - GASTROINTESTINAL HEMORRHAGE, UNSPECIFIED   Status: Acute   





(2) Anemia due to acute blood loss


Code(s): D62 - ACUTE POSTHEMORRHAGIC ANEMIA   Status: Acute   





(3) Chronic anticoagulation


Code(s): Z79.01 - LONG TERM (CURRENT) USE OF ANTICOAGULANTS   Status: Acute   





(4) History of recurrent deep vein thrombosis (DVT)


Code(s): Z86.718 - PERSONAL HISTORY OF OTHER VENOUS THROMBOSIS AND EMBOLISM   

Status: Acute   





(5) Hx pulmonary embolism


Code(s): Z86.711 - PERSONAL HISTORY OF PULMONARY EMBOLISM   Status: Acute   





(6) Closed L2 vertebral fracture


Code(s): S32.029A - UNSP FRACTURE OF SECOND LUMBAR VERTEBRA, INIT FOR CLOS FX   

Status: Acute   





(7) Intractable low back pain


Code(s): M54.5 - LOW BACK PAIN   Status: Acute   





(8) Physical deconditioning


Code(s): R53.81 - OTHER MALAISE   Status: Chronic   





(9) HLD (hyperlipidemia)


Code(s): E78.5 - HYPERLIPIDEMIA, UNSPECIFIED   Status: Chronic   





(10) HTN (hypertension)


Code(s): I10 - ESSENTIAL (PRIMARY) HYPERTENSION   Status: Chronic   





(11) Depression


Code(s): F32.9 - MAJOR DEPRESSIVE DISORDER, SINGLE EPISODE, UNSPECIFIED   Status

: Acute   


Qualifiers: 


   Depression Type: reactive depression   Qualified Code(s): F32.9 - Major 

depressive disorder, single episode, unspecified   


Plan: 


since   3-4 months ago








(12) Diverticulosis


Code(s): K57.90 - DVRTCLOS OF INTEST, PART UNSP, W/O PERF OR ABSCESS W/O BLEED 

  Status: Chronic   





- Plan





H/H dropped from 11 to 9. GI consult. Holding Eliquis.


Transfuse if H/H drops below 7 or hemodynamically unstable


Continue PT/OT.


Duloxetine for her depression


DVT proph: SCDs

## 2020-03-02 NOTE — NM
Nuclear Medicine Gi Bleeding Scan



INDICATION: Acute GI bleed



Radiopharmaceutical: 27 mCi technetium 99m tagged red blood cells IV



FINDINGS:



Sequential planar images were obtained abdomen and pelvis after administration of the radiopharmaceut
ical. Sequential images were obtained over 46 minutes. These images were reviewed on cine film.



No abnormal region of radiotracer accumulation is seen that conforms to bowel and moves with time to 
suggest presence of an acute GI bleed.



IMPRESSION: No active GI bleed identified.



Reported By: Tarik Alexander 

Electronically Signed:  3/2/2020 3:56 PM

## 2020-03-02 NOTE — PDOC.HOSPP
- Subjective


Encounter Date: 03/02/20


Subjective: 





Feels fine.  no complaints.  





- Objective


Vital Signs & Weight: 


 Vital Signs (12 hours)











  Temp Pulse Resp BP


 


 03/02/20 16:00  97.4 F L  79  16  105/53 L


 


 03/02/20 12:10  97.4 F L  77  18  109/55 L








 Weight











Weight                         240 lb 6 oz














I&O: 


 











 03/01/20 03/02/20 03/03/20





 06:59 06:59 06:59


 


Intake Total  2850 1290


 


Output Total   200


 


Balance  2850 1090











Result Diagrams: 


 03/02/20 17:46





 03/02/20 03:28





Hospitalist ROS





- Medication


Medications: 


Active Medications











Generic Name Dose Route Start Last Admin





  Trade Name Freq  PRN Reason Stop Dose Admin


 


Hydrocodone Bitart/Acetaminophen  1 tab  03/01/20 12:00  03/02/20 18:25





  Norco 10/325  PO   1 tab





  Q6HR STACI   Administration





     





     





     





     


 


Atorvastatin Calcium  20 mg  03/01/20 21:00  03/02/20 20:34





  Lipitor  PO   20 mg





  HS STACI   Administration





     





     





     





     


 


Cyclobenzaprine HCl  10 mg  03/01/20 08:07  03/01/20 21:06





  Flexeril  PO   10 mg





  TIDPRN PRN   Administration





  Muscle Spasm   





     





     





     


 


Duloxetine HCl  30 mg  03/01/20 21:00  03/02/20 20:34





  Cymbalta  PO   30 mg





  HS STACI   Administration





     





     





     





     


 


Hydrochlorothiazide  12.5 mg  03/01/20 09:00  03/02/20 12:57





  Hydrochlorothiazide  PO   12.5 mg





  DAILY STACI   Administration





     





     





     





     


 


Sodium Chloride  1,000 mls @ 100 mls/hr  02/29/20 20:00  03/02/20 09:26





  Normal Saline 0.9%  IV   Not Given





  .Q10H STACI   





     





     





     





     


 


Levothyroxine Sodium  112 mcg  03/02/20 06:00  03/02/20 05:09





  Synthroid  PO   112 mcg





  0600 STACI   Administration





     





     





     





     


 


Levothyroxine Sodium  25 mcg  03/02/20 06:00  03/02/20 05:09





  Synthroid  PO   25 mcg





  0600 STACI   Administration





     





     





     





     


 


Multivitamins  1 tab  03/01/20 09:00  03/02/20 18:26





  Theragran  PO   Not Given





  DAILY STACI   





     





     





     





     


 


Pantoprazole Sodium  40 mg  03/01/20 09:00  03/02/20 12:57





  Protonix  PO   40 mg





  DAILY STACI   Administration





     





     





     





     


 


Prednisone  30 mg  03/02/20 08:00  03/02/20 09:02





  Prednisone  PO   Not Given





  QA- STACI   





     





     





     





     














- Exam


Heart: RRR, no gallops, no rubs, normal peripheral pulses, murmur present


Respiratory: CTAB, no wheezes, no rales, no ronchi, normal chest expansion, no 

tachypnea, normal percussion


Gastrointestinal: soft, non-tender, non-distended, normal bowel sounds, no 

palpable masses, no hepatomegaly, no splenomegaly, no bruit


Extremities: no cyanosis, no clubbing, no edema


Skin: normal turgor


Musculoskeletal: normal tone


Psychiatric: normal affect, normal behavior, A&O x 3





Hosp A/P


(1) Anemia due to acute blood loss


Code(s): D62 - ACUTE POSTHEMORRHAGIC ANEMIA   Status: Acute   





(2) Chronic anticoagulation


Code(s): Z79.01 - LONG TERM (CURRENT) USE OF ANTICOAGULANTS   Status: Acute   





(3) History of recurrent deep vein thrombosis (DVT)


Code(s): Z86.718 - PERSONAL HISTORY OF OTHER VENOUS THROMBOSIS AND EMBOLISM   

Status: Acute   





(4) Lower GI bleed


Code(s): K92.2 - GASTROINTESTINAL HEMORRHAGE, UNSPECIFIED   Status: Acute   





(5) Diverticulosis


Code(s): K57.90 - DVRTCLOS OF INTEST, PART UNSP, W/O PERF OR ABSCESS W/O BLEED 

  Status: Chronic   





(6) Closed L2 vertebral fracture


Code(s): S32.029A - UNSP FRACTURE OF SECOND LUMBAR VERTEBRA, INIT FOR CLOS FX   

Status: Acute   





(7) Hiatal hernia


Code(s): K44.9 - DIAPHRAGMATIC HERNIA WITHOUT OBSTRUCTION OR GANGRENE   Status: 

Acute   





(8) History of DVT (deep vein thrombosis)


Code(s): Z86.718 - PERSONAL HISTORY OF OTHER VENOUS THROMBOSIS AND EMBOLISM   

Status: Acute   





(9) HLD (hyperlipidemia)


Code(s): E78.5 - HYPERLIPIDEMIA, UNSPECIFIED   Status: Chronic   





(10) HTN (hypertension)


Code(s): I10 - ESSENTIAL (PRIMARY) HYPERTENSION   Status: Chronic   





- Plan





Blood in colon on scope.  


Diverticulosis, but nos specific site of loss.


Blood in the terminal ileum. 


Bleeding scan negative. 





Repeat hgb.





Requests eval by Dr. Toussaint for lumbar fracture.


Daughters also want him to assess for possible Parkinson's.

## 2020-03-02 NOTE — OP
DATE OF PROCEDURE:  03/02/2020



PROCEDURES PERFORMED:  Esophagogastroduodenoscopy and colonoscopy.



PREOPERATIVE DIAGNOSES:  Acute gastrointestinal bleed and anemia of acute blood loss.



DESCRIPTION OF PROCEDURE:  Informed consent was obtained from the patient.  She was

sedated with total intravenous anesthesia.  The bite block was placed and the upper

endoscope was advanced at least to the fourth portion of the duodenum.  The

esophagus was normal.  The GE junction appeared normal.  There was a fold

immediately below the GE junction consistent with a prior fundoplication.  However,

this was slipped and there was still a large hiatal hernia measuring at least 6 cm.

The stomach was otherwise normal including retroflexed views.  The pylorus and first

and second and third and fourth portions of the duodenum were normal.  There was no

stigmata of recent bleeding.  The patient was turned around.  Rectal exam was

performed and was normal.  There was a large amount of red blood that we cleaned up

and that was present throughout the entire colon.  The colonoscope was advanced 10

to 15 cm into the terminal ileum.  There was red blood in the terminal ileum

staining the walls and it was unclear if this originated from proximal to the

terminal ileum or if it refluxed back up into the terminal ileum from the colon.

The ileocecal valve and appendiceal orifice were clearly identified.  There was a

large amount of dark red blood throughout the colon and staining the walls of the

colon.  There was severe diverticulosis throughout the entire colon.  All the

diverticula had blood clots in them.  There was no active bleeding at the time of

the procedure.  The preparation quality was poor to see the mucosa in light of all

the adherent blood.  Retroflexed views in the rectum were unremarkable. 



IMPRESSION:  

1. Large hiatal hernia.

2. Otherwise normal EGD to the fourth portion of the duodenum.

3. Severe diverticulosis throughout the colon.

4. There is a large amount of blood throughout the colon with clots and all the

diverticula.  There was no active bleeding at the time of the procedure. 

5. There was red blood in the terminal ileum and it cannot be determined if this

originated from a small-bowel source proximal to the terminal ileum or if it

refluxed back into the terminal ileum from the colon. 

6. She has an acute bleed, larger volume now, but in addition that she has had

chronic iron-deficiency anemia.  It is unclear if this bleeding source is a new

diverticular bleed or if she is just now bleeding more briskly from whatever might

have caused chronic iron deficiency for more proximal to the colon. 



RECOMMENDATIONS:  Nuclear medicine abdominal bleeding scan.







Job ID:  [id number]

## 2020-03-03 NOTE — DIS
DATE OF ADMISSION:  02/27/2020



DATE OF DISCHARGE:  02/29/2020



FINAL DIAGNOSES:  

1. Acute onset of gastrointestinal bleeding, unspecified.

2. Long-term use of anticoagulation/Eliquis for her history of recurrent deep venous

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 

3. Chronic iron deficiency requiring iron transfusion every month as followed by

hematologist. 

    a. Last infusion received on 02/29/2020 from [facility].



SECONDARY DIAGNOSES:  

1. Intractable lumbar pain __________ closed compression fracture.

2. Hypothyroidism.

3. Hypertension.

4. Hyperlipidemia.



HISTORY OF THE PRESENT ILLNESS AND HOSPITAL COURSE:  Ms. Coates is a 79-year-old

 female with multiple chronic medical conditions including chronic iron

deficiency anemia, requiring iron infusion every month and long-term use of Eliquis.

 The patient was initially admitted to [facility] secondary to

intractable low back pain.  She was found to have closed lumbar spine __________

compression fracture by CT scan of the spine.  The patient was treated for pain

management in the hospital.  After 3 nights of stay in acute, she was transferred to

[facility] for skilled rehab.  The patient was doing well in rehab.  She started

walking some prior to discharge.  She had received an iron infusion on 02/29/2020 as

per recommendation of FRANCINE Pereira hematologist as the patient was due for her

routine monthly iron infusion.  At that time, patient was asymptomatic.  Her vitals

have been stable.  Her hemoglobin at that time was 12.2 with hematocrit of 38.9 and

platelets of 223.  Baseline hemoglobin at 13.2 upon admission on 02/27/2022.  On

02/29/2020, patient has had a bright red streak stool.  The patient was reported to

have no BM for the last few days and on that day, she had a big bowel movement.  The

patient was observed, but reported to have progression of bloody stools and that

became melenic.  Her repeat hemoglobin at that time went down to 11.8 and 37.5

hematocrit.  The patient reported to have cold clammy sweat, but vitals remained

stable.  The patient was then subsequently transferred to [facility] with a specialist for further evaluation of GI bleeding. 



PHYSICAL EXAMINATION:

VITAL SIGNS:  Temperature 98.3, respirations 18, O2 saturations 96%, blood pressure

138/72, pulse rate 60. 







Job ID:  [id number]

## 2020-03-03 NOTE — PRG
DATE OF SERVICE:  03/03/2020



SUBJECTIVE:  Ms. Coates has had no bowel movements since the colonoscopy.  She has

had no abdominal pain. 



OBJECTIVE:  VITAL SIGNS:  Temperature 98.1, pulse 65, blood pressure 141/65. 

GENERAL:  She is in no acute distress.  Awake, alert, and oriented x3.  Her daughter

is with her today. 

LUNGS:  Clear to auscultation bilaterally. 

HEART:  Regular rate and rhythm without murmur. 

ABDOMEN:  Soft, nontender, nondistended.  Bowel sounds are present. 

EXTREMITIES:  No lower extremity edema.



LABORATORY DATA:  White blood cell count 7.3; hemoglobin this morning was 6.2,

however, hemoglobin came up to 8.3 after 1 unit.  Last night, her hemoglobin was

7.1.  I think 6.2 this morning was likely diluted. 



IMPRESSION:  

1. Gastrointestinal bleed, most likely diverticular.  There was some blood in the

terminal ileum which could have come from more proximal bleeding, but I think this

is more likely reflux from the colon from a diverticular bleed.  She has had no

further overt bleeding.  I think the drop in the blood count this morning is more

likely re-equilibration.  I will advance her diet and recheck her hemoglobin

tomorrow morning. 

2. Chronic iron-deficiency anemia, for which she receives iron infusions at least

monthly. 



RECOMMENDATIONS:  

1. Advance diet.

2. Monitor trend of her hemoglobin.

3. The overt bleeding appears to have stopped at this point.  Question now will be

when can she restart anticoagulation in the future if she does not have recurrent 

bleeding in the short-term.  I would hold her anticoagulation at least for a couple

weeks and then at that point, the risk-benefit ratio can be determined.  She did

have pulmonary embolism previously 4 or 5 years ago, for which she is on the

Eliquis. 







Job ID:  [id number]

## 2020-03-03 NOTE — PDOC.HOSPP
- Subjective


Encounter Date: 03/03/20


Subjective: 





Feels fine except the potassium is burning the vein.  





- Objective


Vital Signs & Weight: 


 Vital Signs (12 hours)











  Temp Pulse Pulse Resp BP BP Pulse Ox


 


 03/03/20 15:40  97.8 F  69   20   143/63 H  97


 


 03/03/20 13:13       152/68 H 


 


 03/03/20 12:02  97.7 F  64   18    96


 


 03/03/20 09:50  98.1 F   74  18  144/64 H   97








 Weight











Weight                         240 lb 6 oz











 Most Recent Monitor Data











Respiration from ECG           18














I&O: 


 











 03/02/20 03/03/20 03/04/20





 06:59 06:59 06:59


 


Intake Total 2850 2596 1190


 


Output Total  800 850


 


Balance 2850 1796 340











Result Diagrams: 


 03/03/20 14:28





 03/03/20 03:45





Hospitalist ROS





- Medication


Medications: 


Active Medications











Generic Name Dose Route Start Last Admin





  Trade Name Freq  PRN Reason Stop Dose Admin


 


Hydrocodone Bitart/Acetaminophen  1 tab  03/01/20 12:00  03/03/20 18:05





  [place] 10/325  PO   Not Given





  Q6HR STACI   





     





     





     





     


 


Atorvastatin Calcium  20 mg  03/01/20 21:00  03/02/20 20:34





  Lipitor  PO   20 mg





  HS STACI   Administration





     





     





     





     


 


Cyclobenzaprine HCl  10 mg  03/01/20 08:07  03/01/20 21:06





  Flexeril  PO   10 mg





  TIDPRN PRN   Administration





  Muscle Spasm   





     





     





     


 


Duloxetine HCl  30 mg  03/01/20 21:00  03/02/20 20:34





  Cymbalta  PO   30 mg





  HS STACI   Administration





     





     





     





     


 


Hydrochlorothiazide  12.5 mg  03/01/20 09:00  03/03/20 09:29





  Hydrochlorothiazide  PO   12.5 mg





  DAILY STACI   Administration





     





     





     





     


 


Levothyroxine Sodium  112 mcg  03/02/20 06:00  03/03/20 05:23





  Synthroid  PO   112 mcg





  0600 STACI   Administration





     





     





     





     


 


Levothyroxine Sodium  25 mcg  03/02/20 06:00  03/03/20 05:23





  Synthroid  PO   25 mcg





  0600 STACI   Administration





     





     





     





     


 


Multivitamins  1 tab  03/01/20 09:00  03/03/20 09:30





  Theragran  PO   1 tab





  DAILY STACI   Administration





     





     





     





     


 


Pantoprazole Sodium  40 mg  03/01/20 09:00  03/03/20 09:30





  Protonix  PO   40 mg





  DAILY STACI   Administration





     





     





     





     


 


Prednisone  30 mg  03/02/20 08:00  03/03/20 09:28





  Prednisone  PO   30 mg





  QAM-WM STACI   Administration





     





     





     





     














- Exam


General Appearance: NAD, awake alert


Heart: RRR, no murmur, no gallops, no rubs, normal peripheral pulses


Respiratory: CTAB, no wheezes, no rales, no ronchi, normal chest expansion, no 

tachypnea, normal percussion


Gastrointestinal: soft, non-tender, non-distended, normal bowel sounds, no 

palpable masses, no hepatomegaly, no splenomegaly, no bruit


Extremities: no cyanosis, no clubbing, no edema


Neurological: no focal deficits


Musculoskeletal: normal tone, normal strength, no muscle wasting


Psychiatric: normal affect, normal behavior, A&O x 3





Hosp A/P


(1) Lower GI bleed


Code(s): K92.2 - GASTROINTESTINAL HEMORRHAGE, UNSPECIFIED   Status: Acute   





(2) Anemia due to acute blood loss


Code(s): D62 - ACUTE POSTHEMORRHAGIC ANEMIA   Status: Acute   





(3) Chronic anticoagulation


Code(s): Z79.01 - LONG TERM (CURRENT) USE OF ANTICOAGULANTS   Status: Acute   





(4) History of recurrent deep vein thrombosis (DVT)


Code(s): Z86.718 - PERSONAL HISTORY OF OTHER VENOUS THROMBOSIS AND EMBOLISM   

Status: Acute   





(5) Diverticulosis


Code(s): K57.90 - DVRTCLOS OF INTEST, PART UNSP, W/O PERF OR ABSCESS W/O BLEED 

  Status: Chronic   





(6) Closed L2 vertebral fracture


Code(s): S32.029A - UNSP FRACTURE OF SECOND LUMBAR VERTEBRA, INIT FOR CLOS FX   

Status: Acute   





(7) Hiatal hernia


Code(s): K44.9 - DIAPHRAGMATIC HERNIA WITHOUT OBSTRUCTION OR GANGRENE   Status: 

Acute   





(8) History of DVT (deep vein thrombosis)


Code(s): Z86.718 - PERSONAL HISTORY OF OTHER VENOUS THROMBOSIS AND EMBOLISM   

Status: Acute   





(9) HLD (hyperlipidemia)


Code(s): E78.5 - HYPERLIPIDEMIA, UNSPECIFIED   Status: Chronic   





(10) HTN (hypertension)


Code(s): I10 - ESSENTIAL (PRIMARY) HYPERTENSION   Status: Chronic   





- Plan





Blood in colon on scope.  


Diverticulosis, but nos specific site of loss.


Blood in the terminal ileum. 


Bleeding scan negative. 


HGB low this am.  Required trnasfusion.





Repeat hgb. in am.


If stable, continue to monitor.  If not, consult with GI to determine next 

steps.





Requests eval by Dr. Toussaint for lumbar fracture.


Daughters also want him to assess for possible Parkinson's.  


Recommended OP followup.





She came from rehab and will likely need to go back.

## 2020-03-04 NOTE — PDOC.HOSPP
- Subjective


Encounter Date: 03/04/20


Subjective: 





Feels well.  Denies complaints.  No BM since the endoscopy. 





- Objective


Vital Signs & Weight: 


 Vital Signs (12 hours)











  Temp Pulse Resp BP Pulse Ox


 


 03/04/20 11:37  97.6 F  61  16  168/71 H  94 L


 


 03/04/20 07:48  97.6 F  55 L  16  142/67 H  95








 Weight











Weight                         249 lb 9 oz











 Most Recent Monitor Data











Respiration from ECG           18














I&O: 


 











 03/03/20 03/04/20 03/05/20





 06:59 06:59 06:59


 


Intake Total 2596 1430 


 


Output Total 800 1530 


 


Balance 1796 -100 











Result Diagrams: 


 03/04/20 07:30





 03/04/20 07:30


Additional Labs: 


 Accuchecks











  03/04/20





  11:22


 


POC Glucose  104














Hospitalist ROS





- Medication


Medications: 


Active Medications











Generic Name Dose Route Start Last Admin





  Trade Name Freq  PRN Reason Stop Dose Admin


 


Hydrocodone Bitart/Acetaminophen  1 tab  03/01/20 12:00  03/04/20 11:32





  [place] 10/325  PO   1 tab





  Q6HR STACI   Administration





     





     





     





     


 


Atorvastatin Calcium  20 mg  03/01/20 21:00  03/03/20 21:35





  Lipitor  PO   20 mg





  HS STACI   Administration





     





     





     





     


 


Cyclobenzaprine HCl  10 mg  03/01/20 08:07  03/01/20 21:06





  Flexeril  PO   10 mg





  TIDPRN PRN   Administration





  Muscle Spasm   





     





     





     


 


Duloxetine HCl  30 mg  03/01/20 21:00  03/03/20 21:35





  Cymbalta  PO   30 mg





  HS STACI   Administration





     





     





     





     


 


Hydrochlorothiazide  12.5 mg  03/01/20 09:00  03/04/20 10:04





  Hydrochlorothiazide  PO   12.5 mg





  DAILY STACI   Administration





     





     





     





     


 


Levothyroxine Sodium  112 mcg  03/02/20 06:00  03/04/20 05:55





  Synthroid  PO   112 mcg





  0600 STACI   Administration





     





     





     





     


 


Levothyroxine Sodium  25 mcg  03/02/20 06:00  03/04/20 05:55





  Synthroid  PO   25 mcg





  0600 STACI   Administration





     





     





     





     


 


Multivitamins  1 tab  03/01/20 09:00  03/04/20 10:02





  Theragran  PO   1 tab





  DAILY STACI   Administration





     





     





     





     


 


Pantoprazole Sodium  40 mg  03/01/20 09:00  03/04/20 10:02





  Protonix  PO   40 mg





  DAILY STACI   Administration





     





     





     





     


 


Prednisone  30 mg  03/02/20 08:00  03/04/20 10:02





  Prednisone  PO   30 mg





  QAM-WM STACI   Administration





     





     





     





     


 


Senna/Docusate Sodium  1 tab  03/01/20 08:07  03/03/20 21:42





  Senokot S  PO   1 tab





  BID PRN   Administration





  Constipation   





     





     





     


 


Sodium Chloride  10 ml  02/29/20 20:37  03/04/20 10:02





  Flush - Normal Saline  IVF   10 ml





  PRN PRN   Administration





  Saline Flush   





     





     





     














- Exam


General Appearance: NAD, awake alert


General - other findings: Obese


Heart: RRR, no murmur, no gallops, no rubs, normal peripheral pulses


Respiratory: CTAB, no wheezes, no rales, no ronchi, normal chest expansion, no 

tachypnea, normal percussion


Gastrointestinal: soft, non-tender, non-distended, normal bowel sounds, no 

palpable masses, no hepatomegaly, no splenomegaly, no bruit


Extremities: no cyanosis, no clubbing, no edema


Skin: normal turgor


Musculoskeletal: generalized weakness


Psychiatric: normal affect, normal behavior, A&O x 3





Hosp A/P


(1) Lower GI bleed


Code(s): K92.2 - GASTROINTESTINAL HEMORRHAGE, UNSPECIFIED   Status: Acute   





(2) Anemia due to acute blood loss


Code(s): D62 - ACUTE POSTHEMORRHAGIC ANEMIA   Status: Acute   





(3) Chronic anticoagulation


Code(s): Z79.01 - LONG TERM (CURRENT) USE OF ANTICOAGULANTS   Status: Chronic   





(4) History of recurrent deep vein thrombosis (DVT)


Code(s): Z86.718 - PERSONAL HISTORY OF OTHER VENOUS THROMBOSIS AND EMBOLISM   

Status: Acute   





(5) Diverticulosis


Code(s): K57.90 - DVRTCLOS OF INTEST, PART UNSP, W/O PERF OR ABSCESS W/O BLEED 

  Status: Chronic   





(6) Closed L2 vertebral fracture


Code(s): S32.029A - UNSP FRACTURE OF SECOND LUMBAR VERTEBRA, INIT FOR CLOS FX   

Status: Acute   





(7) Hiatal hernia


Code(s): K44.9 - DIAPHRAGMATIC HERNIA WITHOUT OBSTRUCTION OR GANGRENE   Status: 

Acute   





(8) History of DVT (deep vein thrombosis)


Code(s): Z86.718 - PERSONAL HISTORY OF OTHER VENOUS THROMBOSIS AND EMBOLISM   

Status: Chronic   





(9) HLD (hyperlipidemia)


Code(s): E78.5 - HYPERLIPIDEMIA, UNSPECIFIED   Status: Chronic   





(10) HTN (hypertension)


Code(s): I10 - ESSENTIAL (PRIMARY) HYPERTENSION   Status: Chronic   





- Plan





Blood in colon on scope.  


Diverticulosis, but nos specific site of loss.


Blood in the terminal ileum. 


Bleeding scan negative. 


HGB low this am.  Required transfusion on 3/3/20.


Hgb eric a little more today.


Communicated with GI.  Will continue to monitor. 


Holding eliquis. 





Was apparently only on Eliquis due to a prior DVT and her lack of mobility that 

put her at high risk for recurrence. 





Requests eval by Dr. Toussaint for lumbar fracture.


Daughters also want him to assess for possible Parkinson's.  


Recommended OP followup.





She came from rehab and will likely need to go back.





Has Purewick and urine became very dark brown this afternoon.


UA ordered.

## 2020-03-04 NOTE — PRG
DATE OF SERVICE:  03/04/2020



SUBJECTIVE:  Ms. Coates had one small dark stool, was mixed with urine today.  
She

has no abdominal pain or other complaints.



OBJECTIVE:

Vitals: T 98.2  P 67  /59

General: Await and alert and interactive, no acute distress.

Lungs: CTA B

CV: RRR without murmur

Abd: soft, nontender, nondisctended, bowel sounds present

Ext: no edema



LABORATORY DATA:  Her hemoglobin was 7.6 today.



IMPRESSION:  Acute gastrointestinal bleed and anemia of acute blood loss.  She 
is

not having any further significant overt bleeding.  She most likely had an acute

diverticular bleed.  Her hemoglobin is still equilibrating after 1 unit 
transfusion

yesterday.  Her hemoglobin was 6.2 and then went up to 8.3 after 1 unit and 
then the

7.6 today, which all could be within realm of an appropriate response to the

transfusion yesterday.  



RECOMMENDAITONS:  I would just recheck her hemoglobin tomorrow and continue

supportive care. 







Job ID:  [id number]



MTDD

## 2020-03-05 NOTE — PRG
DATE OF SERVICE:  03/05/2020



SUBJECTIVE:  The patient is seen and examined at the bedside.  The patient's

daughter is present in the room during my visit.  She is very weak.  She is able to

tolerate her meals. 



OBJECTIVE:  VITAL SIGNS:  Blood pressure is 155/69, pulse is 56, respiratory rate is

16, O2 saturation is 94% on room air, and her temperature is 97.8. 

HEENT:  Head is atraumatic and normocephalic.  Eyes are PERRLA.  Sclerae are

nonicteric.  Oral mucosa is moist. 

NECK:  Supple. 

LUNGS:  Clear. 

HEART:  S1 and S2 normal.  No S3.  No S4.  There is a systolic murmur mostly audible

at the left sternal border 2/6. 

ABDOMEN:  Obese, soft, and nontender. 

EXTREMITIES:  No clubbing, cyanosis, or edema. 

NEUROLOGIC:  She follows my commands.  She moves all 4 extremities.



LABORATORY DATA:  Labs showed white count of 9.2, hemoglobin 8.1, hematocrit 25.2,

and platelet count is 241. 



IMPRESSION:  

1. Lower gastrointestinal bleeding most likely from diverticular disease.

2. Anemia due to acute blood loss with hemoglobin stable at 8.1.

3. Chronic anticoagulation on hold.

4. History of recurrent deep venous thrombosis.

5. Diverticulosis.

6. Closed L2 vertebral fracture.

7. Hiatal hernia.

8. Hyperlipidemia.

9. Hypertension.



PLAN:  The patient is doing well.  She needs more physical therapy.  She is

participating in sessions with PT therapist.  She needs more to get her strength

back.  She will be transferred to swing bed in [place] as soon as the bed is

available for PT.  Her hemoglobin is stable at this point.  We are holding Eliquis

and she will probably be restarted on Eliquis in the next 2 to 3 weeks.  The case

was discussed with Dr. Huynh, who recommends further evaluation with GI and a

hematologist since she is getting monthly iron infusions for anemia. 







Job ID:  [id number]

## 2020-03-06 NOTE — PRG
DATE OF SERVICE:  03/06/2020



SUBJECTIVE:  The patient is seen and examined at bedside.  She had transient, very

brief episode of altered mental status, most likely related to her hydrocodone she

has used last night for the pain control, but she is fine. 



OBJECTIVE:  VITAL SIGNS:  Blood pressure is 180/75, pulse is 57, respirations 14, O2

saturation is 94% on room air, and her temperature is 97.8. 

HEENT:  Her head is atraumatic and normocephalic.  Eyes are PERRLA.  Sclerae are

nonicteric.  Oral mucosa is moist. 

NECK:  Supple. 

LUNGS:  Clear. 

HEART:  S1 and S2 normal.  No S3.  No S4.  No any murmur. 

ABDOMEN:  Soft and nontender.  Bowel sounds are present.  No organomegaly. 

EXTREMITIES:  No clubbing, cyanosis, or edema. 

NEUROLOGIC:  She is alert and oriented x4.  There is no any motor deficits.  She

shows some generalized weakness all over her body in upper and lower extremities. 



LABORATORY DATA:  None this morning.



IMPRESSION:  

1. Lower gastrointestinal bleeding, most likely from diverticular disease, resolved.

2. Anemia due to acute blood loss.

3. Chronic anticoagulation with apixaban on hold at this point.

4. History of recurrent deep venous thrombosis.

5. Diverticulosis.

6. Closed L2 vertebral fracture, symptomatic.

7. Hiatal hernia.

8. Hyperlipidemia.

9. Hypertension.



PLAN:  The patient is started on amlodipine for her blood pressure issues she had

during this hospitalization.  Her Eliquis is on hold.  She is going to wait until

swing bed is available in [place], then she will be transferred for PT.  For

now, she will continue 

PT in our hospital until the bed is available or she gets to the point that she can

be discharged safely home.  If she stays, we will get H and H tomorrow morning and

she will have to follow up with Dr. Zhang and hematologist in the next 2 to 3 weeks

after the discharge. 







Job ID:  [id number]

## 2020-03-07 NOTE — PRG
DATE OF SERVICE:  03/07/2020



SUBJECTIVE:  The patient is seen and examined at the bedside.  She had hypotensive

episode with some rectal bleeding this morning and she was moved to the [facility] for

further closer followup.  She is not in any pain.  She does not have much complaints

to offer at the time of my visit. 



OBJECTIVE:  VITAL SIGNS:  Blood pressure is 95/55, pulse is 63, respiratory rate is

20, O2 saturation 96% on room air, temperature is 97.4. 

HEENT:  Head is atraumatic and normocephalic.  Eyes are PERRLA.  Sclerae are

nonicteric.  Oral mucosa is moist. 

NECK:  Supple. 

LUNGS:  Clear. 

HEART:  S1 and S2, normal.  No S3.  No S4.  Systolic murmur at the left sternal

border, mostly audible, 2/6. 

ABDOMEN:  Sore at the left lower quadrant.  No guarding.  No masses. 

EXTREMITIES:  No clubbing, cyanosis, or edema. 

NEUROLOGIC:  She is alert and oriented x4.  There are no any motor deficits.



LABORATORY DATA:  Showed white count of 12.7, hemoglobin of 8.3, hematocrit 25.8,

platelet count 343,000. 



IMPRESSION:  

1. Recurrent rectal bleeding.  Her hemoglobin this morning was 8.3, slightly better

than what it was yesterday at 8.1.  We will get hemoglobin and hematocrit now and we

will see the trend of hemoglobin level.  We will re-consult GI.  If her blood

pressure drops lower, we will have to start her on IV fluids. 

2. Anemia due to acute blood loss.

3. Chronic anticoagulation with apixaban, which is on hold at this point.

4. History of recurrent deep venous thrombosis.

5. Diverticulosis.

6. Closed L2 vertebral fracture, symptomatic.

7. Hiatal hernia.

8. Hyperlipidemia.

9. Hypertension.







Job ID:  [id number]

## 2020-03-08 NOTE — PRG
DATE OF SERVICE:  03/08/2020



SUBJECTIVE:  The patient is seen and examined at the bedside.  She is doing quite

well.  She did not have any bowel movement.  She did not have any nausea or

vomiting.  There was no any rectal bleeding from yesterday.  She is tolerating food. 



OBJECTIVE:  VITAL SIGNS:  Blood pressure 146/80, pulse 53, respiratory rate 18, O2

saturation 98% on room air, and temperature 96.8. 

HEENT:  Her head is atraumatic and normocephalic.  Eyes are PERRLA.  Sclerae are

nonicteric.  Oral mucosa is moist. 

NECK:  Supple. 

LUNGS:  Clear. 

HEART:  S1 and S2 normal. 

ABDOMEN:  Soft and nontender.  Bowel sounds are present. 

EXTREMITIES:  No clubbing, cyanosis or edema. 

NEUROLOGICAL:  She is alert and oriented x4.  There is no any motor deficits.



LABORATORY DATA:  Hemoglobin of 7.8 and hematocrit of 23.9 this morning, which is

down from 8.1 and 24.6 respectively. 



IMPRESSION:  

1. Recurrent rectal bleeding.  The patient's hemoglobin is down to 7.8, which is not

that significantly lower, but Dr. Daugherty was notified about the change.

Clinically, she looks good and she feels good.  No more recommendation from GI.  I

personally discussed the case with Dr. Daugherty. 

2. Anemia due to acute blood loss.

3. Chronic anticoagulation with apixaban, which is on hold obviously at this point.

4. History of recurrent deep venous thrombosis.

5. Diverticulosis.

6. Closed L2 vertebral fracture, symptomatic.

7. Hiatal hernia.

8. Hyperlipidemia.

9. Hypertension.



PLAN:  The patient needs to stay probably for additional day or two to make sure

that she does not have any rebleed.  We are going to recheck her H and H in the

morning, and she is awaiting for a swing bed to get PT done after she is discharged

from the hospital. 







Job ID:  [id number]

## 2020-03-08 NOTE — PRG
DATE OF SERVICE:  03/08/2020



SUBJECTIVE:  This is a 79-year-old  female hospitalized a few days ago with

GI bleeding.  She underwent EGD by Dr. Robert Huynh on 03/02/2020 and was found to

have hiatal hernia and diffuse colonic diverticular disease.  She had blood

throughout the colon at the time of colonoscopy.  The patient had an episode of

bleeding yesterday and her blood pressure dropped.  However, her blood count

remained stable.  She has had no recurrence of bleeding.  She is tolerating diet.

No abdominal pain.  No nausea, vomiting.  The hematocrit and her hemoglobin today

showed mild drop to 7.8 and 23.9.  Not a whole lot of difference for the last 48

hours. 



OBJECTIVE:  VITAL SIGNS:  Afebrile, pulse is 56, blood pressure 153/64. 

CARDIOVASCULAR:  Lungs within normal limits. 

ABDOMEN:  Soft.  No organomegaly.  No tenderness.  No masses.



IMPRESSION:  

1. Gastrointestinal bleeding subsequent to a fall.

2. Blood count is stable and she is hemodynamically stable.



RECOMMENDATION:  

1. May go to the medical floor when bed is available.

2. Followup H and H and when stable hopefully, she can be discharged home in the

next day or two. 







Job ID:  [id number]

## 2020-03-09 NOTE — CON
DATE OF CONSULTATION:  03/07/2020



REFERRING DOCTOR:  Dr. Wei.



REASON FOR CONSULTATION:  GI bleeding.



HISTORY OF PRESENT ILLNESS:  Ms. Eryn Coates is a very pleasant 79-year-old

 female, hospitalized with hematochezia and anemia.  She underwent EGD and

colonoscopy by Dr. Robert Huynh on 03/02/2020.  The EGD showed hiatal hernia

__________ severe pan diverticular disease.  She was found to have blood throughout

the colon.   __________ Apparently, she had an episode of passing blood yesterday

morning and she was hypotensive.  She was transferred to the [facility].  Interestingly,

she has no recurrence of bleeding.  No abdominal pain. No nausea or vomiting.

__________ bleeding.  Over the last 48 hours, has been staying around 8.1, 8.3, 8.1.

 On 03/05/2020, her hemoglobin was 8.1.  Next day, it came out to 8.3 and the last

one today at 8.1.  The hematocrit readings ranging from 35 to 24.  She has no

abdominal pain, no nausea or vomiting.  Vital signs are actually stable.  She is

tolerating diet well. 



PHYSICAL EXAMINATION:

GENERAL:  She is obese, appears comfortable. 

VITAL SIGNS: Her vital signs are actually very stable.  Her pulse is 63 and blood

pressure is 104/70. 

HEENT:  Conjunctivae are clear. 

CARDIOVASCULAR: First and second heart sounds. 

LUNGS:  Clear to auscultation. 

ABDOMEN:  Soft.  No organomegaly.  No tenderness.  No masses.



CLINICAL IMPRESSION:  Gastrointestinal bleeding, most likely she is passing old

blood.  Blood count has remained stable around 8.3 and 8.1.  As per the operative

report by Dr. Huynh,  __________old blood in colon at that time.  The patient is

reassured. 



RECOMMENDATION:  Repeat hemoglobin and hematocrit tomorrow and watch her.  If she

remains stable, she can probably go on to  medical floor and hopefully she can be

discharged home in the next 48 hours. 







Job ID:  [id number]

## 2020-03-09 NOTE — PDOC.HOSPP
- Subjective


Encounter Date: 03/09/20


Encounter Time: 10:45


Subjective: 





Mr. Coates was lower GI bleed. She does not have any complaints today.





- Objective


Vital Signs & Weight: 


 Vital Signs (12 hours)











  Temp


 


 03/09/20 07:14  98.2 F


 


 03/09/20 03:32  97.8 F


 


 03/08/20 23:25  98.6 F








 Weight











Weight                         233 lb 9.6 oz











 Most Recent Monitor Data











Heart Rate from ECG            60


 


NIBP                           125/95


 


NIBP BP-Mean                   105


 


Respiration from ECG           22


 


SpO2                           100














I&O: 


 











 03/08/20 03/09/20 03/10/20





 06:59 06:59 06:59


 


Intake Total  510 


 


Output Total  1200 


 


Balance  -690 











Result Diagrams: 


 03/09/20 03:04





 03/04/20 07:30





Hospitalist ROS





- Medication


Medications: 


Active Medications











Generic Name Dose Route Start Last Admin





  Trade Name Freq  PRN Reason Stop Dose Admin


 


Hydrocodone Bitart/Acetaminophen  1 tab  03/05/20 03:00  03/09/20 10:03





  [place] 10/325  PO   1 tab





  0300,0900,1500,2100 STACI   Administration





     





     





     





     


 


Atorvastatin Calcium  20 mg  03/01/20 21:00  03/08/20 20:42





  Lipitor  PO   20 mg





  HS STACI   Administration





     





     





     





     


 


Cyclobenzaprine HCl  10 mg  03/01/20 08:07  03/04/20 21:24





  Flexeril  PO   10 mg





  TIDPRN PRN   Administration





  Muscle Spasm   





     





     





     


 


Duloxetine HCl  30 mg  03/01/20 21:00  03/08/20 20:42





  Cymbalta  PO   30 mg





  HS STACI   Administration





     





     





     





     


 


Hydrochlorothiazide  12.5 mg  03/01/20 09:00  03/09/20 10:02





  Hydrochlorothiazide  PO   12.5 mg





  DAILY STACI   Administration





     





     





     





     


 


Levothyroxine Sodium  112 mcg  03/02/20 06:00  03/09/20 05:41





  Synthroid  PO   112 mcg





  0600 STACI   Administration





     





     





     





     


 


Levothyroxine Sodium  25 mcg  03/02/20 06:00  03/09/20 05:41





  Synthroid  PO   25 mcg





  0600 STACI   Administration





     





     





     





     


 


Multivitamins  1 tab  03/01/20 09:00  03/09/20 10:03





  Theragran  PO   1 tab





  DAILY STACI   Administration





     





     





     





     


 


Pantoprazole Sodium  40 mg  03/01/20 09:00  03/09/20 10:02





  Protonix  PO   40 mg





  DAILY STACI   Administration





     





     





     





     


 


Prednisone  30 mg  03/02/20 08:00  03/09/20 10:03





  Prednisone  PO   30 mg





  QAM-WM STACI   Administration





     





     





     





     


 


Senna/Docusate Sodium  1 tab  03/01/20 08:07  03/03/20 21:42





  Senokot S  PO   1 tab





  BID PRN   Administration





  Constipation   





     





     





     


 


Sodium Chloride  10 ml  02/29/20 20:37  03/04/20 10:02





  Flush - Normal Saline  IVF   10 ml





  PRN PRN   Administration





  Saline Flush   





     





     





     














- Exam


Eye: PERRL, anicteric sclera


Heart: RRR, no gallops, no rubs, normal peripheral pulses, murmur present, II/IV


Respiratory: CTAB (+ rhonchi at the bases)


Gastrointestinal: soft, non-tender, non-distended, normal bowel sounds, no 

palpable masses, no hepatomegaly, no splenomegaly


Extremities: no cyanosis, no edema





Hosp A/P


(1) History of recurrent deep vein thrombosis (DVT)


Code(s): Z86.718 - PERSONAL HISTORY OF OTHER VENOUS THROMBOSIS AND EMBOLISM   

Status: Acute   





(2) Lower GI bleed


Code(s): K92.2 - GASTROINTESTINAL HEMORRHAGE, UNSPECIFIED   Status: Acute   





(3) Chronic anticoagulation


Code(s): Z79.01 - LONG TERM (CURRENT) USE OF ANTICOAGULANTS   Status: Chronic   





- Plan





* Lower GI bleed- her H&H has truman stable overall


* No evidence of further bleeding


* She is stable for transition to skilled nursing


* She will need to be off anticoagulation for a few weeks.

## 2020-03-10 NOTE — DIS
DATE OF ADMISSION:  02/29/2020



DATE OF DISCHARGE:  03/09/2020



DISCHARGE DISPOSITION:  To skilled nursing facility.



DISCHARGE DIAGNOSES:  

1. Lower gastrointestinal bleed, presumed to be due to diverticulosis.

2. Diverticular disease.

3. History of deep venous thrombosis.

4. Pulmonary embolism.

5. Breast cancer.

6. Hypothyroidism.

7. Acute blood loss anemia.



DISCHARGE MEDICATIONS:  

1. Please note that Xarelto is on hold for the next two weeks.  At that time, the

risk, benefits of restarting need to be determined. 

2. Prednisone 30 mg daily.

3. Protonix 40 mg daily.

4. Theragran-M once daily.

5. Flexeril 10 mg t.i.d. as needed.

6. Levothyroxine 137 mcg p.o. daily.

7. Norco 10/325 q.6 as needed.

8. Hydrochlorothiazide 12.5 mg p.o. daily.

9. __________ q.6 as needed.

10. Iron sulfate 324 mg daily.

11. Cymbalta 30 mg daily.

12. Lipitor 20 mg at bedtime.



IMAGING DONE DURING THE HOSPITAL STAY:  The patient had a bleeding scan showing no

active bleeding.  The code status is DNAR. 



ALLERGIES:  TO PHENAZOPYRIDINE, ADHESIVES, AMOXICILLIN, PENICILLIN, ASPIRIN, MILK,

ICE CREAM, PORK, PAPER TAPE, AND KEFLEX. 



HOSPITAL COURSE:  Ms. Coates is a pleasant 79-year-old female, who was admitted to

the hospital after she was noted to have bright red blood per rectum or

hematochezia.  She was evaluated in the ER and found to have significant blood loss

anemia.  She is on Eliquis for a previous history of DVT and PE.  Tagged bleeding

scan was done.  At that time, there was no active bleeding.  She was seen by

Gastroenterology and underwent colonoscopy and EGD.  The EGD demonstrated a large

hiatal hernia.  It was otherwise negative to the fourth portion of the duodenum.

There was severe diverticulosis throughout the colon.  There was a large amount of

blood in the colon with some clots and it was felt that the bleeding was likely due

to diverticular disease.  She was monitored in the hospital a couple of more days to

establish stability.  She did require 1 unit of packed red blood to be transfused.

After she was clinically stable, she was able to be discharged to the [place]

Swing Bed.  I did speak with her daughter prior to discharge with regard to the

Xarelto that is currently on hold and will need to be restarted at least two weeks

down the road after the risks and benefits were discussed.  The patient was

discharged to the swing bed on 03/09/2020. 







Job ID:  [id number]

## 2020-03-11 NOTE — HP
PRIMARY CARE PHYSICIAN:  Samanta Crenshaw MD



HISTORY OF PRESENT ILLNESS:  Ms. Coates is a 79-year-old female with a history of

hypertension, hypothyroidism, depression, anxiety, recurrent DVTs, and chronic

anemia, who is being admitted to skilled nursing for rehabilitation.  The patient

was discharged on March 09, 2020, from [facility] for lower

gastrointestinal bleed, thought to be related to diverticulosis.  The patient was

previously in skilled, where she was noted to have bright red blood per rectum.  She

was evaluated in the ER and found to have significant blood loss anemia.  She had a

tagged red blood cell scan, that did not show any active bleeding.  She then was

seen by GI, who performed colonoscopy and EGD.  The EGD portion showed a large

hiatal hernia, but had very severe diverticulosis coli.  There was a large amount of

blood in the colon, and due to the presence of diverticula, the bleeding was thought

to be diverticular in nature.  She did require a unit of packed red blood cells to

be transfused during that period of time.  Eliquis was stopped and was recommended

that Eliquis be stopped for a period of 2 weeks, and the risks and benefits of

resuming __________ to be discussed thereafter.  The patient was discharged from the

hospital in stable condition.  At this time, the patient denies any further bleeding

episodes.  She denies any abdominal pain.  She does report back pain; however, it is

bearable. 



PAST MEDICAL HISTORY:  

1. History of DVT and PE, on long-term anticoagulation.

2. Hyperlipidemia.

3. Hypertension.

4. History of breast cancer.

5. Depression and anxiety.

6. Diverticulosis with diverticulitis.

7. Iron deficiency anemia.

8. Hypothyroidism.



PAST SURGICAL HISTORY:  

1. Appendectomy.

2. Cholecystectomy.

3. Renal stent.

4. Hemorrhoidectomy.

5. Hysterectomy.

6. Left mastectomy.

7. Right breast lumpectomy.

8. Left shoulder surgery.

9. Thyroidectomy.

10. Tonsillectomy.

11. Hernia repair.

12. Skin cancer removal.

13. Left ankle surgery.



SOCIAL HISTORY:  The patient is a former smoker.  She denies any current tobacco

use.  Denies alcohol and drug use as well.  The patient lives at home with her

daughter and son-in-law. 



FAMILY HISTORY:  Noncontributory.



ALLERGIES:  

1. AMOXICILLIN.

2. ASPIRIN.

3. CODEINE.

4. NSAIDS.

5. PENICILLIN.

6. AZO.

7. SULFA DRUGS.

8. MILK.

9. ICE CREAM.

10. PAPER TAPE.



CURRENT MEDICATIONS:  

1. Levothyroxine 137 mcg p.o. q.a.m.

2. Hydrochlorothiazide 12.5 mg p.o. daily.

3. Iron 325 mg p.o. b.i.d.

4. Atorvastatin 20 mg p.o. at bedtime.

5. Cymbalta 30 mg daily.

6. Norco 10/325 q.6 hours p.r.n.

7. Flexeril 10 mg t.i.d. p.r.n.

8. Protonix 40 mg p.o. daily.



REVIEW OF SYSTEMS:  GENERAL:  The patient denies any fevers, chills, or night

sweats. 

HEENT:  The patient denies vision changes or eye pain. 

CARDIOVASCULAR:  The patient denies chest pain or palpitations. 

RESPIRATORY:  The patient denies cough, shortness of breath, or wheezing. 

ABDOMEN:  The patient denies any abdominal pain, nausea, vomiting, or diarrhea. 

Genitourinary:  The patient denies dysuria, urinary frequency, or incontinence. 

MUSCULOSKELETAL:  The patient reports low back pain. 

NEUROLOGIC:  The patient denies any numbness, tingling, or confusion. 

PSYCHIATRIC:  The patient denies current symptoms of anxiety and depression. 

SKIN:  The patient denies any rashes or lesions.



PHYSICAL EXAMINATION:

VITAL SIGNS:  Temperature 98.7, pulse 63, respirations 16, oxygen 96% on room air,

blood pressure 133/79. 

GENERAL:  The patient is alert and oriented x3, in no apparent distress. 

HEENT:  Normocephalic and atraumatic.  Extraocular muscles are intact.  Pupils are

equal, round, and reactive to light.  Moist mucous membranes. 

CARDIAC:  Regular rate and rhythm.  No murmurs, rubs, or gallops.  Normal S1 and S2. 

LUNGS:  Clear to auscultation bilaterally.  No crackles, wheezes, or rales. 

ABDOMEN:  Soft and nontender to palpation.  No distention noted.  No organomegaly or

masses. 

EXTREMITIES:  Normal perfusion.  No peripheral edema. 

NEUROLOGIC:  Cranial nerves 2 through 12 intact grossly. 

SKIN:  Intact with no rashes or lesions. 

PSYCHIATRIC:  Appropriate mood and affect.



ASSESSMENT AND PLAN:  

1. Physical deconditioning.  The patient is admitted to skilled nursing for

rehabilitation, and Physical Therapy and Occupational Therapy have been ordered and

will evaluate the patient tomorrow. 

2. L2 fracture.  Continue pain medications as needed.  Physical therapy will be

resumed as well. 

3. Blood loss anemia.  Per the recommendations given during last hospitalization,

Eliquis has been held, and we will hold for 2 weeks and discuss the risks and

benefits of resuming Eliquis after that time.  It was recommended that the patient's

iron supplementation be stopped as well due to the fact that it can be constipating,

which can potentially worsen diverticular bleeding.  Continue outpatient followup

with Hematology. 

4. Hypertension.  We will resume the patient's home antihypertensive regimen.  The

patient's daughter stated that amlodipine was added at the last hospitalization.  We

will hold off on resuming amlodipine at this time; however, we will monitor blood

pressures and initiate it as needed. 

5. Depression and anxiety.  We will continue Cymbalta.  The patient's daughter

states that the patient's sertraline was being tapered on an outpatient basis;

however, during last hospitalization, the patient did not receive it for a week.

The patient denies any current symptoms of serotonin discontinuation.  So, at this

point, we will not resume sertraline. 

6. Dyslipidemia.  We will resume statin medication.

7. History of deep venous thrombosis and pulmonary embolism.  We will hold Eliquis

as described above. 

8. Hypothyroidism.  We will resume the patient's levothyroxine.

9. Code status, DNR.  Discussed with the patient and her daughter at the bedside.

10. Deep venous thrombosis prophylaxis.  Sequential compression devices.







Job ID:  [id number]

## 2020-04-03 NOTE — DIS
DATE OF ADMISSION:  03/09/2020



DATE OF DISCHARGE:  04/03/2020



REASON FOR ADMISSION:  Skilled rehab in [place] after recent hospitalization.



CONDITION ON DISCHARGE:  Stable.



DISPOSITION:  Home with daughter.



FINAL DIAGNOSES:  

1. Physical deconditioning.

2. L2 compression fracture, closed.

3. Acute on chronic anemia, secondary to diverticular bleed, requiring blood

transfusions. 

4. Iron deficiency anemia, chronic, requiring monthly iron infusion by her

hematologist. 

5. Hypertension, controlled.

6. Depression and anxiety.

7. Dyslipidemia.

8. History of recurrent deep venous thrombosis and pulmonary embolism, previously on

long-term use of Eliquis, currently with on hold secondary to recent history of

gastrointestinal bleeding. 

9. Hypothyroidism.



MEDICATIONS:  

1. Amlodipine 5 mg p.o. daily.

2. Cyclobenzaprine 10 mg p.o. t.i.d. p.r.n.

3. Docusate 100 mg p.o. b.i.d. p.r.n.

4. Duloxetine 30 mg p.o. at bedtime.

5. Hydrochlorothiazide 12.5 mg p.o. daily.

6. Lidocaine patch 5% q.12.

7. Polyethylene glycol 17 g daily.

8. Melatonin 3 mg p.o. at bedtime.

9. Pantoprazole 40 mg p.o. daily.



DISCHARGE INSTRUCTIONS:  



DIET:  Low salt, low fat.



ACTIVITY:  To use rolling walker at all times.  Fall precautions.



FOLLOW UP:  

1. Follow up with Dr. Crenshaw in 2 weeks.

2. Follow up with Dr. Zhang as previously scheduled for Gastroenterology care.

3. Follow up with Hematology in 1 month for iron infusion.

4. Follow up with Dr. Toussaint/neurosurgeon for further evaluation of back pain per

family's request.  Family to call the neurosurgeon's clinic as appointment is on

hold at this time. 

5. Refer to [facility] per request for PT, OT eval and treat.

6. Refer to Dr. Delong for Cardiology care.  This will be scheduled by the family.  I

will refer to Dr. Delong to discuss the need for using the Eliquis.  This will be

scheduled by the family. 



HISTORY OF THE PRESENT ILLNESS AND HOSPITAL COURSE:  Ms. Elizabeth is a very

pleasant 79-year-old  female with multiple chronic medical conditions

including hypertension, hypothyroidism, depression, anxiety, recurrent DVTs, history

of pulmonary embolism, and chronic anemia, requiring iron transfusion every month by

her dermatologist.  The patient has been on anticoagulant with Eliquis as initiated

by her cardiologist.  The patient was recently admitted at [facility] for

intractable low back pain on 02/24/2020.  She was on her rehab when she had an acute

onset of bright rectal bleeding.  The patient was subsequently transferred to [facility] in [place] for further GI evaluation on 02/29/2020.  The

patient underwent an extensive GI workup including colonoscopy at that time.  The

patient was found to have diverticular bleeding.  The patient requires to have a

blood transfusion secondary to symptomatic anemia.  At that time, her hemoglobin

went down to the 6 range.  Post transfusion, the patient reports significant

improvement of symptoms.  She has been symptom free for GI bleeding, but was

severely deconditioned, thus she was transferred back to [facility] on

03/09/2020 to continue her rehab.  While in rehab, the patient's back pain has

markedly improved.  She does well with lidocaine patch alone with p.r.n. use of

muscle relaxants and rarely narcotic.  Family still wanting to have further

evaluation in her back.  She was then referred to Dr. Toussaint per family's request

for further treatment of compression L2 fracture.  Awaiting appointment at this

time.  The patient remained symptom free as far as her GI issues.  She received

another dose of iron supplement prior to discharge for her monthly iron transfusions

in coordination with hematologist as represented by FRANCINE Pereira.  The patient

did well with rehab and was very adamant to go home.  Prior to discharge, she was

walking 50 feet using rolling walker.  She remained to have slow danay, endurance.

 Her overall rehab potential was deemed limited by pain in her lower back during

exercises.  Per therapy, the patient had reached her max potential and almost at her

baseline.  She was then discharged on 04/03/2020 with recommendations to continue

rehab with home health at home. 



LABORATORY DATA:  On 03/20/2020, hemoglobin of 8, hematocrit 27.3, and platelets

138. 



On 03/27/2020, hemoglobin 9.2, hematocrit 31.5, and platelets 236.  Ferritin on

03/27, 46.49. 



PHYSICAL EXAMINATION:

VITAL SIGNS:  Prior to discharge; blood pressure 158/64, temperature 97.8, O2 sat is

95% on room air, and respirations 18.  Weight 241 pounds and 8 ounces.  Height 5

feet 5 inches. 

GENERAL:  The patient was examined prior to discharge.  She was oriented x3,

comfortable on exam, able to transfer from sitting to standing position using her

rolling walker.  She walks with wide based gait and slow danay, but able to walk

on her own. 

LUNGS:  Clear. 

HEART:  At sinus rhythm. 

EXTREMITIES:  She has no leg edema.  No cyanosis and no focal signs. 



Time spent on this discharge, 32 minutes in examining the patient and coordinating

care. 







Job ID:  [id number]

## 2020-07-20 NOTE — PDOC.HHP
Hospitalist HPI





- History of Present Illness


Weakness and UTI symptoms


History of Present Illness: 





Very pleasant 80-year-old female with extensive past medical history presents 

with generalized weakness and urinary tract infection symptoms was found to be 

in atrial fibrillation with rapid ventricular response. Patient with past 

medical history of breast cancer status post mastectomy, DVT and pulmonary 

embolism who has been stopped on anticoagulation for G.I. bleeding, 

gastrointestinal bleeding, diverticulitis, rectal hemorrhoids, hypothyroidism, 

hypertension, hyperlipidemia, and physical deconditioning. Patient states that 

she was at home with her daughter who is her primary caretaker and has been 

complaining of a few days of UTI symptoms. Patient went into see her primary 

care physician today and was found to be in atrial fibrillation with rapid 

ventricular response in the office. Patient was sent to [facility] emergency 

department and then transferred to a higher level of care at [facility]. I find the patient in the intermediate medical care floor, she is 

sitting up in the bed eating her dinner. Patient is breathing comfortably on 

room air and in no acute distress. The patient does endorse UTI symptoms and 

generalized weakness. The patient also complains of an abnormal feeling on the 

left side of her chest which she states feels like a muscle spasming. Patient 

denies chest pain, shortness of breath, syncope, fever, or other sick symptoms. 

The patient was started on Cardizem drip which has controlled her rate. With 

the patient's history of massive G.I. bleeding with anticoagulation who has 

failed Eliquis therapy chemical and coagulation is contraindicated. The patient 

will be admitted to the intermediate medical care for for close management. 

Cardiology consultation requested for further recommendations.





Hospitalist ROS





- Review of Systems


All other systems reviewed; all pertinent +/- noted in HPI/Subj





Hospitalist History





- Past Medical History


Source: patient, old records


Cardiac: reports: HTN, Hyperlipidemia, Other (Heart murmur - though she does 

not know the name or type)


Pulmonary: reports: high cholesterol, pulmonary embolism


Gastrointestinal: reports: Diverticulosis, GERD, GI bleed, Hemorrhoids


Heme/Onc: reports: Anemia NOS (gets infusions/ transfusions mounthly at 

hematology office associated with [facility])


Musculoskeletal: reports: Osteoarthritis


Endocrine: reports: Hypothyroidism





- Past Surgical History


Past Surgical History: reports: Appendectomy, Cholecystectomy, Hysterectomy, 

Hernia Repair, Mastectomy, Other (Orthopedic surgery)





- Family History


Family History: reports: hypertension





- Social History


Smoking Status: Never smoker


Alcohol: reports: None


Drugs: reports: none


Living Situation: With Family


Domestic Violence: Negative


Activity level: uses cane/walker





- Exam


General Appearance: NAD, awake alert


Eye: PERRL


ENT: normocephalic atraumatic, moist mucosa


Neck: supple, symmetric, no lymphadenopathy


Heart: no murmur, no gallops, no rubs, irregular (rapid), murmur present


Respiratory: CTAB, no wheezes, no rales, no ronchi


Gastrointestinal: soft, non-tender, non-distended, no guarding, no rigidity


Extremities: no edema


Skin: no lesions, no rashes


Neurological: cranial nerve grossly intact, no focal deficits


Musculoskeletal: generalized weakness


Psychiatric: normal affect, normal behavior, A&O x 3





Hospitalist Results





- Radiology Interpretation


  ** Chest x-ray


Status: image reviewed by me





Hospitalist H&P A/P





- Problem


(1) Atrial fibrillation with RVR


Code(s): I48.91 - UNSPECIFIED ATRIAL FIBRILLATION   Status: Acute   





(2) UTI (urinary tract infection)


Status: Acute   





(3) Anemia


Code(s): D64.9 - ANEMIA, UNSPECIFIED   Status: Acute   





(4) Anxiety


Code(s): F41.9 - ANXIETY DISORDER, UNSPECIFIED   Status: Acute   





(5) Depression


Code(s): F32.9 - MAJOR DEPRESSIVE DISORDER, SINGLE EPISODE, UNSPECIFIED   Status

: Acute   


Qualifiers: 


   Depression Type: reactive depression   Qualified Code(s): F32.9 - Major 

depressive disorder, single episode, unspecified   





(6) Generalized weakness


Code(s): R53.1 - WEAKNESS   Status: Acute   





(7) Hiatal hernia


Code(s): K44.9 - DIAPHRAGMATIC HERNIA WITHOUT OBSTRUCTION OR GANGRENE   Status: 

Acute   





(8) History of recurrent deep vein thrombosis (DVT)


Code(s): Z86.718 - PERSONAL HISTORY OF OTHER VENOUS THROMBOSIS AND EMBOLISM   

Status: Acute   





(9) Hx pulmonary embolism


Code(s): Z86.711 - PERSONAL HISTORY OF PULMONARY EMBOLISM   Status: Acute   





(10) Intractable low back pain


Code(s): M54.5 - LOW BACK PAIN   Status: Acute   





(11) Lower GI bleed


Code(s): K92.2 - GASTROINTESTINAL HEMORRHAGE, UNSPECIFIED   Status: Acute   





(12) Need for assistance due to unsteady gait


Code(s): R26.89 - OTHER ABNORMALITIES OF GAIT AND MOBILITY   Status: Acute   





(13) Anemia, iron deficiency


Code(s): D50.9 - IRON DEFICIENCY ANEMIA, UNSPECIFIED   Status: Chronic   





(14) Diverticulosis


Code(s): K57.90 - DVRTCLOS OF INTEST, PART UNSP, W/O PERF OR ABSCESS W/O BLEED 

  Status: Chronic   





(15) HLD (hyperlipidemia)


Code(s): E78.5 - HYPERLIPIDEMIA, UNSPECIFIED   Status: Chronic   





(16) HTN (hypertension)


Code(s): I10 - ESSENTIAL (PRIMARY) HYPERTENSION   Status: Chronic   





(17) History of DVT (deep vein thrombosis)


Code(s): Z86.718 - PERSONAL HISTORY OF OTHER VENOUS THROMBOSIS AND EMBOLISM   

Status: Chronic   





(18) Normocytic anemia


Code(s): D64.9 - ANEMIA, UNSPECIFIED   Status: Chronic   





(19) Physical deconditioning


Code(s): R53.81 - OTHER MALAISE   Status: Chronic   





- Plan


Plan: 





Plan:


Admit to intermediate medical care unit


cardiology consultation, recommendations appreciated


Cardizem drip for rate control


Elevated troponin at 0.2, No acute ST-T seg abnormalities to suggest no STEMI


anticoagulation is contraindicated with history of massive G.I. bleeding, has 

failed Eliquis in the outpatient setting from massive G.I. bleeding


antibiotics for urinary tract infection, de-escalate to culture and sensitivity 

as able


urine culture


continue other home medications as able


blood pressure control next line blood sugar control


G.I. prophylaxis


DVT prophylaxis - SCDs

## 2020-07-20 NOTE — RAD
PORTABLE CHEST 1 VIEW:

 

Date:  07/20/2020

Time:  1302 hours

 

HISTORY:  

New onset atrial fibrillation. 

 

COMPARISON:  

02/19/2020. 

 

FINDINGS:

The heart is enlarged. The aorta is tortuous. The lungs are well expanded without focal areas of cons
olidation, pneumothoraces, scott pulmonary edema, or pleural effusions. The hiatal hernia is better v
isualized on the previous study. Postop changes of metallic hardware are noted in the left humerus. 

 

IMPRESSION: 

No acute process. 

 

 

 

POS: SNEHA

## 2020-07-21 NOTE — PDOC.HOSPP
- Subjective


Subjective: 





Seen and examined. Breathing comfortably on room air. Heart rate controlled on 

Cardizem. No chest pain. Echocardiogram reviewed, preserved ejection fraction. 

We're waiting for him from cardiology.





- Objective


Vital Signs & Weight: 


 Vital Signs (12 hours)











  Temp Pulse Pulse Resp BP BP Pulse Ox


 


 07/21/20 14:07   77   18   115/78  98


 


 07/21/20 13:23    88   129/90  


 


 07/21/20 13:05   90   21 H   141/79 H  100


 


 07/21/20 12:00   90   16   114/83  100


 


 07/21/20 11:25  98.9 F      


 


 07/21/20 11:00   99   20   115/58 L  98


 


 07/21/20 10:00   97   18   107/72  98


 


 07/21/20 09:00   94   18   124/90  95


 


 07/21/20 08:20        100


 


 07/21/20 07:18  98.8 F      


 


 07/21/20 04:00  97.7 F      














  Pulse Ox


 


 07/21/20 14:07 


 


 07/21/20 13:23  98


 


 07/21/20 13:05 


 


 07/21/20 12:00 


 


 07/21/20 11:25 


 


 07/21/20 11:00 


 


 07/21/20 10:00 


 


 07/21/20 09:00 


 


 07/21/20 08:20 


 


 07/21/20 07:18 


 


 07/21/20 04:00 








 Weight











Weight                         224 lb











 Most Recent Monitor Data











Heart Rate from ECG            76


 


NIBP                           124/90


 


NIBP BP-Mean                   101


 


Respiration from ECG           16


 


SpO2                           98














I&O: 


 











 07/20/20 07/21/20 07/22/20





 06:59 06:59 06:59


 


Intake Total  565 


 


Balance  565 











Result Diagrams: 


 07/21/20 03:52





 07/21/20 03:52


Radiology Reviewed by me: Yes





Hospitalist ROS





- Review of Systems


All other systems reviewed; all pertinent +/- noted in HPI/Subj





- Medication


Medications: 


Active Medications











Generic Name Dose Route Start Last Admin





  Trade Name Freq  PRN Reason Stop Dose Admin


 


Acetaminophen  650 mg  07/20/20 19:12  07/21/20 14:35





  Tylenol  PO   650 mg





  Q4H PRN   Administration





  Headache/Fever/Mild Pain (1-3)   





     





     





     


 


Atorvastatin Calcium  20 mg  07/20/20 21:00  07/20/20 21:28





  Lipitor  PO   20 mg





  HS STACI   Administration





     





     





     





     


 


Duloxetine HCl  30 mg  07/20/20 21:00  07/20/20 21:28





  Cymbalta  PO   30 mg





  HS STACI   Administration





     





     





     





     


 


Hydrochlorothiazide  12.5 mg  07/21/20 09:00  07/21/20 09:40





  Hydrochlorothiazide  PO   12.5 mg





  DAILY STACI   Administration





     





     





     





     


 


Levothyroxine Sodium  88 mcg  07/21/20 06:00  07/21/20 06:06





  Synthroid  PO   88 mcg





  0600 STACI   Administration





     





     





     





     


 


Melatonin  3 mg  07/20/20 19:10  07/20/20 21:44





  Melatonin  PO   3 mg





  HS PRN   Administration





  Insomnia   





     





     





     


 


Multivitamins  1 tab  07/21/20 09:00  07/21/20 09:40





  Theragran  PO   1 tab





  DAILY STACI   Administration





     





     





     





     


 


Pantoprazole Sodium  40 mg  07/21/20 09:00  07/21/20 09:40





  Protonix  PO   40 mg





  DAILY STACI   Administration





     





     





     





     


 


Polyethylene Glycol  17 gm  07/20/20 21:00  07/20/20 21:28





  Miralax  PO   Not Given





  HS STACI   





     





     





     





     














- Exam


General Appearance: NAD, awake alert


Eye: PERRL


ENT: normocephalic atraumatic, moist mucosa


Neck: supple, symmetric, no lymphadenopathy


Heart: no gallops, no rubs, irregular, murmur present


Respiratory: CTAB, no wheezes, no rales, no ronchi, normal chest expansion


Gastrointestinal: soft, non-tender, non-distended, no guarding, no rigidity


Extremities: no edema


Skin: no lesions, no rashes


Neurological: cranial nerve grossly intact, no focal deficits


Musculoskeletal: generalized weakness


Psychiatric: normal affect, normal behavior, A&O x 3





Hosp A/P


(1) Atrial fibrillation with RVR


Code(s): I48.91 - UNSPECIFIED ATRIAL FIBRILLATION   Status: Acute   





(2) UTI (urinary tract infection)


Status: Acute   





(3) Anemia


Code(s): D64.9 - ANEMIA, UNSPECIFIED   Status: Acute   





(4) Anxiety


Code(s): F41.9 - ANXIETY DISORDER, UNSPECIFIED   Status: Acute   





(5) Depression


Code(s): F32.9 - MAJOR DEPRESSIVE DISORDER, SINGLE EPISODE, UNSPECIFIED   Status

: Acute   


Qualifiers: 


   Depression Type: reactive depression   Qualified Code(s): F32.9 - Major 

depressive disorder, single episode, unspecified   





(6) Generalized weakness


Code(s): R53.1 - WEAKNESS   Status: Acute   





(7) Hiatal hernia


Code(s): K44.9 - DIAPHRAGMATIC HERNIA WITHOUT OBSTRUCTION OR GANGRENE   Status: 

Acute   





(8) History of recurrent deep vein thrombosis (DVT)


Code(s): Z86.718 - PERSONAL HISTORY OF OTHER VENOUS THROMBOSIS AND EMBOLISM   

Status: Acute   





(9) Hx pulmonary embolism


Code(s): Z86.711 - PERSONAL HISTORY OF PULMONARY EMBOLISM   Status: Acute   





(10) Intractable low back pain


Code(s): M54.5 - LOW BACK PAIN   Status: Acute   





(11) Lower GI bleed


Code(s): K92.2 - GASTROINTESTINAL HEMORRHAGE, UNSPECIFIED   Status: Acute   





(12) Need for assistance due to unsteady gait


Code(s): R26.89 - OTHER ABNORMALITIES OF GAIT AND MOBILITY   Status: Acute   





(13) Anemia, iron deficiency


Code(s): D50.9 - IRON DEFICIENCY ANEMIA, UNSPECIFIED   Status: Chronic   





(14) Diverticulosis


Code(s): K57.90 - DVRTCLOS OF INTEST, PART UNSP, W/O PERF OR ABSCESS W/O BLEED 

  Status: Chronic   





(15) HLD (hyperlipidemia)


Code(s): E78.5 - HYPERLIPIDEMIA, UNSPECIFIED   Status: Chronic   





(16) HTN (hypertension)


Code(s): I10 - ESSENTIAL (PRIMARY) HYPERTENSION   Status: Chronic   





(17) History of DVT (deep vein thrombosis)


Code(s): Z86.718 - PERSONAL HISTORY OF OTHER VENOUS THROMBOSIS AND EMBOLISM   

Status: Chronic   





(18) Normocytic anemia


Code(s): D64.9 - ANEMIA, UNSPECIFIED   Status: Chronic   





(19) Physical deconditioning


Code(s): R53.81 - OTHER MALAISE   Status: Chronic   





- Plan





Plan:


Intermediate medical care unit


Cardiology consultation, recommendations appreciated


Cardizem drip for rate control


anticoagulation is contraindicated with history of massive G.I. bleeding, has 

failed Eliquis in the outpatient setting


Echocardiogram noted


antibiotics for urinary tract infection, de-escalate to culture and sensitivity 

as able


urine culture


continue with her home medications as able


blood pressure control


blood sugar control


G.I. prophylaxis


DVT prophylaxis  SCDs

## 2020-07-21 NOTE — CON
DATE OF CONSULTATION:  2020



INDICATION FOR CONSULTATION:  This is an 80-year-old female, who has been 
diagnosed

yesterday with atrial fibrillation, rapid ventricular response.  We were asked 
to

see her due to the atrial fibrillation.  I have followed this lady for many 
years. 



HISTORY OF PRESENT ILLNESS:  This lady who is now 80 years old, thought she was

having a urinary tract infection.  She was having some discomfort.  She then

presented to her primary care physician and the evaluation indicated that she 
was in

atrial fibrillation.  She was sent to the emergency room and then was admitted 
for

further evaluation and treatment.  Her heart rates in the 90s to 100s on 
admission

yesterday in the emergency room, and since that time, her EKG has been up to 110
's 

that I have noticed.  The admitting EKG was 106 beats per minute with a left

bundle-branch block.  As far as I can determine from my review of her previous

records, I do not see any previous atrial fibrillation.  She has had a history 
of

DVTs and PEs in the past, and had been placed on Eliquis in the past.  She 
recently

was in the hospital earlier this year due to a GI bleed associated with the 
Eliquis

and this was stopped.  She is obviously not a very good candidate for any of 
oral

anticoagulation. We need to control the heart rate, uncertain as to exactly

how much GI evaluation she has had.  She does have a history of diverticulosis 
and

the diverticulitis may have been the etiology of the GI bleed in the past.  We 
will

need to review those records.  She may need to be seen again, may need to have

further evaluation.  She has had colonoscopies in the past.  At this time, she 
is

comfortable.  She denies any chest pain.  She did tell me she did not have any 
chest

pain.  She just felt uncomfortable, and otherwise has been doing relatively 
well.

She mainly thought she had a urinary tract infection when she presented to the

primary MD the  atrial fibrillation actually was a coincidental finding.  At 
this

time, she remains in atrial fibrillation.  The rate is under reasonable control 
at

this time and she is comfortable. 



PAST MEDICAL HISTORY:  Significant for hypertension, hyperlipidemia, iron-
deficiency

anemia, most likely associated with her GI bleeding.  She has had DVTs in the 
past.

She has had pulmonary embolus in the past.  She has hypothyroidism.  She has 
had an

appendectomy.  She has had a cholecystectomy.  She has had a colonoscopy.  She 
has

had a D and C.  She has fibrocystic disease.  She has had thyroid surgery. 



She does have a history of chronic back pain.  She has had liver cyst, which was

diagnosed in .  She has had peptic ulcer disease.  

She has a history of breast cancer.  She has fibromyalgia.  



Her last echocardiogram

that I saw from the office showed ejection fraction about 65% with mild left

ventricular dilatation and borderline left atrial enlargement with mild 
tricuspid

and pulmonary valve regurgitation, mild mitral annular calcification.  At this 
time,

I believe a repeat echocardiogram is pending. 



FAMILY HISTORY:  Noncontributory.



SOCIAL HISTORY:  She denies any alcohol or tobacco abuse.



ALLERGIES:  SHE IS ALLERGIC TO CEPHALEXIN, CODEINE, PENICILLIN, AND SUPPOSEDLY

ASPIRIN, Oral anticoagulants .( DUE TO GI BLEEDING). 



MEDICATIONS:  As of May of 2020, she was takin. Cymbalta 30 mg once a day.

2. Melatonin 3 mg at bedtime as needed.

3. Prilosec 20 mg, I think it was over-the-counter.

4. Colace 100 mg once a day.

5. MiraLAX as needed.

6. Synthroid 137 mcg once a day.

7. Tylenol 325 mg as needed.

8. Multivitamins.

9. Hydrochlorothiazide 12.5 mg q.a.m.

10. Amlodipine 5 mg a day.

11. Lipitor 20 mg a day.

12. Cyclobenzaprine HCL 10 mg as needed.

13. Colestipol HCL 1 g tablets 1 tablet twice a day.



REVIEW OF SYSTEMS:  12-point review of systems is unremarkable except what is 
noted

in the history of present illness. 



PHYSICAL EXAMINATION:

GENERAL:  Reveals a well-developed, well-nourished, very pleasant female. 

VITAL SIGNS:  Her blood pressure is 115/58, heart rate is in the 90s and about 
97 at

this time and shows atrial fibrillation, respiratory rate 16, and she is 
afebrile. 

HEENT:  Shows the head to be normocephalic and atraumatic.  Carotid pulses are

present.  I did not hear any significant bruits. 

CHEST:  Actually was clear to auscultation.  I did not hear any rales, rhonchi, 
or

wheezing. 

CARDIOVASCULAR:  Reveals an irregularly irregular rhythm.  She has a systolic 
murmur

at the apex about 3/6, which radiates almost the entire precordium. 

ABDOMEN:  Soft and nontender.  Positive bowel sounds are present. 

EXTREMITIES:  Show no clubbing, cyanosis, or edema.  Pedal pulses are present. 

NEUROLOGIC:  She appears to be fully intact. 

SKIN:  Warm and dry.



LABORATORY DATA:  WBC of 7.5, hemoglobin is 11.9, hematocrit is 38, and platelet

count is 217,000.  Her sodium is 138, potassium 3.6, bicarb is 23, BUN is 18,

creatinine 0.87, blood sugar is 88.  Cardiac enzymes are slightly elevated,

indeterminate at 0.25, decreased down to 0.23, most likely reflection of the 
atrial

fibrillation with rapid ventricular response.  The urinalysis showed 2+ 
bacteria.

There are no cultures noted yet, no growth in the cultures.  Her chest x-ray

performed yesterday, showed some mild dilatation of the heart and aorta was 
somewhat

tortuous.  There was no evidence of significant infiltrates or acute changes. 



IMPRESSION:  

1. New-onset atrial fibrillation with rapid ventricular response.  The rate is 
under

somewhat better control, so we will try  low-dose of beta blockers to

control the heart rate.  I do not see that she is on a beta blocker at this 
time.

She has not had any problems with significant bradycardia in the past.  She is 
not a

very good candidate for anticoagulation due to the history of gastrointestinal 
bleed

in the past unless she is seen by a gastroenterologist and then could be 
cleared for

possible anticoagulation.  The other possibility would be to see whether or not 
the

patient may be a candidate for a LARIAT procedure since she is unable to take

anticoagulation.  Most likely, if we cannot give her oral anticoagulation, then 
she

is not a candidate for the Watchman device as this would also require at least 3

months of an oral anticoagulation after implantation.  Also, she would not be a

candidate at this time for an ablation since this also would require

anticoagulation.  We will continue to follow her and we will monitor the heart 
rate

and hopefully, we will be able to control this with medications. 

2. History of hypertension.  This is under very good control at this time.  We 
will

continue her medications with hydrochlorothiazide and amlodipine. 

3. Hypercholesterolemia.  I do not see a recent cholesterol level, but in the 
past,

her cholesterol has been controlled on the Lipitor that she has been taking. 

4. History of deep venous thromboses and pulmonary emboli.  She has not had any

recent episodes and again due to her gastrointestinal bleeding, she is not a

candidate for anticoagulation at this time.  We suggest perhaps she is to see in

consultation with a gastroenterologist to see whether or not she is a reasonable

candidate, whether or not she is still having any episodes of bleeding.  Her

hemoglobin is slightly low at 11.9, and back in March when I saw her in the

hospital, her hemoglobin was actually 9.2 at one time, so she has improved.  
She has

not had a recent stress test except in  which was normal.  We may consider

repeating a stress test at this time to see whether or not she has any 
underlying

coronary artery disease that may be causing  her atrial fibrillation. 

5. Also, she has a history of hypothyroidism, has been on thyroid medications.  
We

will need to make sure that her thyroid level is within reasonable limits and 
not

too elevated as this may also be an etiology of her atrial fibrillation. 







Job ID:  [id number]



MTDD

## 2020-07-22 NOTE — PDOC.CPN
- Subjective


Date: 07/22/20


Time: 15:35


Interval history: 





The pt seen and examined.  No overnight events.  No cardiac complaints. 





- Objective


Allergies/Adverse Reactions: 


 Allergies











Allergy/AdvReac Type Severity Reaction Status Date / Time


 


phenazopyridine HCl Allergy Severe Rash Verified 07/20/20 19:08





[From Azo]     


 


adhesive Allergy   Verified 07/20/20 19:08


 


amoxicillin Allergy  Nausea Verified 07/20/20 19:08


 


cephalexin monohydrate Allergy  Rash Verified 07/20/20 19:08





[From Keflex]     


 


Cephalosporins Allergy   Verified 07/20/20 19:08


 


codeine Allergy   Verified 07/20/20 19:08


 


milk Allergy   Verified 07/20/20 19:08


 


niacin Allergy   Verified 07/20/20 19:08


 


Penicillins Allergy  Rash Verified 07/20/20 19:08


 


Sulfa (Sulfonamide Allergy  Rash Verified 07/20/20 19:08





Antibiotics)     


 


aspirin AdvReac   Verified 07/20/20 19:08


 


ICE CREAM Allergy Mild  Uncoded 07/20/20 19:08


 


paper tape Allergy Mild Rash Uncoded 07/20/20 19:08


 


PORK Allergy Mild  Uncoded 07/20/20 19:08











Visit Medications: 


 Current Medications





Acetaminophen (Tylenol)  650 mg PO Q4H PRN


   PRN Reason: Headache/Fever/Mild Pain (1-3)


   Last Admin: 07/22/20 04:45 Dose:  650 mg


Hydrocodone Bitart/Acetaminophen (Norco 5/325)  1 tab PO Q4H PRN


   PRN Reason: Moderate Pain (4-6)


Hydrocodone Bitart/Acetaminophen (Norco 7.5/325)  1 tab PO Q4H PRN


   PRN Reason: Severe Pain (7-10)


Albuterol/Ipratropium (Duoneb)  3 ml NEB [id number] PRN


   PRN Reason: SOB &/or Wheezing


Atorvastatin Calcium (Lipitor)  20 mg PO HS [facility]


   Last Admin: 07/21/20 20:05 Dose:  20 mg


Benzonatate (Tessalon)  100 mg PO Q4H PRN


   PRN Reason: Cough


Diphenhydramine HCl (Benadryl)  25 mg PO Q6H PRN


   PRN Reason: Itching & Insomnia


Docusate Sodium (Colace)  100 mg PO BIDPRN PRN


   PRN Reason: Constipation


Duloxetine HCl (Cymbalta)  30 mg PO HS [facility]


   Last Admin: 07/21/20 20:05 Dose:  30 mg


Hydrochlorothiazide (Hydrochlorothiazide)  12.5 mg PO DAILY [facility]


   Last Admin: 07/22/20 09:44 Dose:  12.5 mg


Levofloxacin 250 mg/ Device  50 mls @ 100 mls/hr IVPB 2100 [facility]


   Last Admin: 07/21/20 20:05 Dose:  50 mls


Diltiazem HCl 125 mg/Miscellaneous Medication 1 each/ Sodium Chloride  125 mls 

@ 5 mls/hr IVPB INF [facility]; Protocol


   Last Admin: 07/21/20 15:20 Dose:  125 mls


Labetalol HCl (Normodyne)  10 mg SLOW IVP Q4H PRN


   PRN Reason: SBP Greater Than 180


Levothyroxine Sodium (Synthroid)  88 mcg PO 0600 [facility]


   Last Admin: 07/22/20 05:07 Dose:  88 mcg


Lidocaine (Lidoderm 5% Patch)  1 patch TD Q12HR [facility]


Loperamide HCl (Imodium)  2 mg PO PRN PRN


   PRN Reason: Diarrhea/Loose Stools


Melatonin (Melatonin)  3 mg PO HS PRN


   PRN Reason: Insomnia


   Last Admin: 07/21/20 20:15 Dose:  3 mg


Metoprolol Tartrate (Lopressor)  12.5 mg PO BID [facility]


   Last Admin: 07/22/20 09:44 Dose:  12.5 mg


Miscellaneous Medication (Pharmacy To Dose)  1 each IVPB PRN PRN


   PRN Reason: UTI


Multivitamins (Theragran)  1 tab PO DAILY [facility]


   Last Admin: 07/22/20 09:44 Dose:  1 tab


Ondansetron HCl (Zofran)  4 mg IVP Q6H PRN


   PRN Reason: Nausea/Vomiting


Pantoprazole Sodium (Protonix)  40 mg PO DAILY [facility]


   Last Admin: 07/22/20 09:44 Dose:  40 mg


Polyethylene Glycol (Miralax)  17 gm PO HS [facility]


   Last Admin: 07/21/20 20:04 Dose:  Not Given


Senna/Docusate Sodium (Senokot S)  1 tab PO BID PRN


   PRN Reason: Constipation








Vital Signs & Weight: 


 Vital Signs











  Temp Pulse Ox


 


 07/22/20 15:11  97.6 F 


 


 07/22/20 11:21  97.6 F 


 


 07/22/20 08:00   94 L


 


 07/22/20 07:32  97.2 F L 


 


 07/22/20 04:00  97.0 F L 








 











Weight                         224 lb

















- Physical Exam


General: alert & oriented x3


HEENT: mucus membranes moist


Cardiac: regular rate and rhythm, S1/S2


Lungs: decreased breath sounds


Neuro: cranial nerve 2-12 intact


Extremities: no edema





- Labs


Result Diagrams: 


 07/21/20 03:52





 07/21/20 03:52


 Troponin/CKMB











Troponin I  0.232 ng/mL (< 0.028)  H  07/21/20  06:33    














- Telemetry


Sinus rhythms and dysrhythmias: sinus rhythm





- Assessment/Plan


Assessment/Plan: 





1. Afib with RVR - Converted back to SR around 1200 on 07/22/2020; may need GI 

consult to start OAC or ASA only due to hx of GI bleed; may start Multaq or 

Amiodarone for Afib management


2. UTI - on ABX, which may need to change to the one which not interact with 

Multaq/Amiodarone 


3. HTN - will resume Norvasc 5mg qd; cannot increase Metoprolol for now due to 

bradycardia


4. HLD - on Statin


5. anemia 


6. hx of DVT/PE - not on OAC or ASA for now due to low Hgb


7. Hypothyroidism


8. Hx of GI bleed


MAR reviewed





* Echo on 07/21/2020 with EF 60-65%, mild LVH, mod LAE, mild MR and TR





<Addendum> Multaq 400mg BID will be started from this PM (Thank you to Dr Hernandez for changing the pt's ABX!)





She is maintaining NSR. She denies any cardiac complaints. I suggest she have a 

nuclear stress test to r/o ischemia as a possible cause of the Afib. If there 

is an abnormality then she will need a cardiac cath and probably a pacemaker. 

Once the pacemaker has been inserted then it will be easier to control the 

atrial fibrillation. 


Chest clear. RRR.

## 2020-07-22 NOTE — PDOC.HOSPP
- Subjective


Subjective: 





Seen and examined. Setting up in the chair. With the addition of beta blocker 

medications the patient's heart rate has gone down into the 30's to 50's. 

However she does appear to be asymptomatic from this, cardiology has been 

updated. They're looking to downgrade her today. Time was given for questions, 

all answered in detail.





- Objective


Vital Signs & Weight: 


 Vital Signs (12 hours)











  Temp Pulse Ox


 


 07/22/20 15:11  97.6 F 


 


 07/22/20 11:21  97.6 F 


 


 07/22/20 08:00   94 L


 


 07/22/20 07:32  97.2 F L 


 


 07/22/20 04:00  97.0 F L 








 Weight











Weight                         224 lb











 Most Recent Monitor Data











Heart Rate from ECG            52


 


NIBP                           171/87


 


NIBP BP-Mean                   115


 


Respiration from ECG           17


 


SpO2                           96














I&O: 


 











 07/21/20 07/22/20 07/23/20





 06:59 06:59 06:59


 


Intake Total 565 1438 


 


Output Total  1200 


 


Balance 565 238 











Result Diagrams: 


 07/21/20 03:52





 07/21/20 03:52


Radiology Reviewed by me: Yes





Hospitalist ROS





- Review of Systems


All other systems reviewed; all pertinent +/- noted in HPI/Subj





- Medication


Medications: 


Active Medications











Generic Name Dose Route Start Last Admin





  Trade Name Freq  PRN Reason Stop Dose Admin


 


Acetaminophen  650 mg  07/20/20 19:12  07/22/20 04:45





  Tylenol  PO   650 mg





  Q4H PRN   Administration





  Headache/Fever/Mild Pain (1-3)   





     





     





     


 


Atorvastatin Calcium  20 mg  07/20/20 21:00  07/21/20 20:05





  Lipitor  PO   20 mg





  HS [facility]   Administration





     





     





     





     


 


Duloxetine HCl  30 mg  07/20/20 21:00  07/21/20 20:05





  Cymbalta  PO   30 mg





  HS [facility]   Administration





     





     





     





     


 


Hydrochlorothiazide  12.5 mg  07/21/20 09:00  07/22/20 09:44





  Hydrochlorothiazide  PO   12.5 mg





  DAILY [facility]   Administration





     





     





     





     


 


Levofloxacin 250 mg/ Device  50 mls @ 100 mls/hr  07/21/20 21:00  07/21/20 20:05





  IVPB   50 mls





  2100 [facility]   Administration





     





     





     





     


 


Diltiazem HCl 125 mg/  125 mls @ 5 mls/hr  07/21/20 07:00  07/21/20 15:20





Miscellaneous Medication 1  IVPB   125 mls





each/ Sodium Chloride  INF [facility]   Administration





     





     





  Protocol   





     


 


Levothyroxine Sodium  88 mcg  07/21/20 06:00  07/22/20 05:07





  Synthroid  PO   88 mcg





  0600 [facility]   Administration





     





     





     





     


 


Melatonin  3 mg  07/20/20 19:10  07/21/20 20:15





  Melatonin  PO   3 mg





  HS PRN   Administration





  Insomnia   





     





     





     


 


Metoprolol Tartrate  12.5 mg  07/21/20 21:00  07/22/20 09:44





  Lopressor  PO   12.5 mg





  BID [facility]   Administration





     





     





     





     


 


Multivitamins  1 tab  07/21/20 09:00  07/22/20 09:44





  Theragran  PO   1 tab





  DAILY [facility]   Administration





     





     





     





     


 


Pantoprazole Sodium  40 mg  07/21/20 09:00  07/22/20 09:44





  Protonix  PO   40 mg





  DAILY [facility]   Administration





     





     





     





     


 


Polyethylene Glycol  17 gm  07/20/20 21:00  07/21/20 20:04





  Miralax  PO   Not Given





  HS [facility]   





     





     





     





     














- Exam


General Appearance: NAD, awake alert


Eye: PERRL


ENT: normocephalic atraumatic, moist mucosa


Neck: supple, symmetric, no lymphadenopathy


Heart: no gallops, no rubs


Heart - other findings: Bradycardia


Respiratory: CTAB, no wheezes, no rales, no ronchi, normal chest expansion


Gastrointestinal: soft, non-tender, no guarding, no rigidity


Extremities: no edema


Skin: no lesions, no rashes


Neurological: cranial nerve grossly intact, no focal deficits


Musculoskeletal: generalized weakness


Psychiatric: normal affect, normal behavior, A&O x 3





Hosp A/P


(1) Atrial fibrillation with RVR


Code(s): I48.91 - UNSPECIFIED ATRIAL FIBRILLATION   Status: Acute   





(2) UTI (urinary tract infection)


Status: Acute   





(3) Anemia


Code(s): D64.9 - ANEMIA, UNSPECIFIED   Status: Acute   





(4) Anxiety


Code(s): F41.9 - ANXIETY DISORDER, UNSPECIFIED   Status: Acute   





(5) Depression


Code(s): F32.9 - MAJOR DEPRESSIVE DISORDER, SINGLE EPISODE, UNSPECIFIED   Status

: Acute   


Qualifiers: 


   Qualified Code(s): F32.9 - Major depressive disorder, single episode, 

unspecified   





(6) Generalized weakness


Code(s): R53.1 - WEAKNESS   Status: Acute   





(7) Hiatal hernia


Code(s): K44.9 - DIAPHRAGMATIC HERNIA WITHOUT OBSTRUCTION OR GANGRENE   Status: 

Acute   





(8) History of recurrent deep vein thrombosis (DVT)


Code(s): Z86.718 - PERSONAL HISTORY OF OTHER VENOUS THROMBOSIS AND EMBOLISM   

Status: Acute   





(9) Hx pulmonary embolism


Code(s): Z86.711 - PERSONAL HISTORY OF PULMONARY EMBOLISM   Status: Acute   





(10) Intractable low back pain


Code(s): M54.5 - LOW BACK PAIN   Status: Acute   





(11) Lower GI bleed


Code(s): K92.2 - GASTROINTESTINAL HEMORRHAGE, UNSPECIFIED   Status: Acute   





(12) Need for assistance due to unsteady gait


Code(s): R26.89 - OTHER ABNORMALITIES OF GAIT AND MOBILITY   Status: Acute   





(13) Anemia, iron deficiency


Code(s): D50.9 - IRON DEFICIENCY ANEMIA, UNSPECIFIED   Status: Chronic   





(14) Diverticulosis


Code(s): K57.90 - DVRTCLOS OF INTEST, PART UNSP, W/O PERF OR ABSCESS W/O BLEED 

  Status: Chronic   





(15) HLD (hyperlipidemia)


Code(s): E78.5 - HYPERLIPIDEMIA, UNSPECIFIED   Status: Chronic   





(16) HTN (hypertension)


Code(s): I10 - ESSENTIAL (PRIMARY) HYPERTENSION   Status: Chronic   





(17) History of DVT (deep vein thrombosis)


Code(s): Z86.718 - PERSONAL HISTORY OF OTHER VENOUS THROMBOSIS AND EMBOLISM   

Status: Chronic   





(18) Normocytic anemia


Code(s): D64.9 - ANEMIA, UNSPECIFIED   Status: Chronic   





(19) Physical deconditioning


Code(s): R53.81 - OTHER MALAISE   Status: Chronic   





- Plan


Consults: Palliative Care





Plan:


Intermediate medical care unit, stable for Med/ tel 


Cardiology consultation, recommendations appreciated


Wean as able Cardizem drip for rate control


Rate control agent


anticoagulation is contraindicated with history of massive G.I. bleeding, has 

failed Eliquis in the outpatient setting


Echocardiogram noted


antibiotics for urinary tract infection


urine culture - e. Coli sensitive to Levaquin, though interferes with anti 

arrythmics - will adjust to Nitrofurantoin.


continue with her home medications as able


blood pressure control


blood sugar control


G.I. prophylaxis


DVT prophylaxis  SCDs

## 2020-07-23 NOTE — PDOC.CPN
- Subjective


Date: 07/23/20


Time: 16:18


Interval history: 





The pt seen and examined.  No overnight events.  No cardiac complaints. 





- Objective


Allergies/Adverse Reactions: 


 Allergies











Allergy/AdvReac Type Severity Reaction Status Date / Time


 


phenazopyridine HCl Allergy Severe Rash Verified 07/20/20 19:08





[From Azo]     


 


adhesive Allergy   Verified 07/20/20 19:08


 


amoxicillin Allergy  Nausea Verified 07/20/20 19:08


 


cephalexin monohydrate Allergy  Rash Verified 07/20/20 19:08





[From Keflex]     


 


Cephalosporins Allergy   Verified 07/20/20 19:08


 


codeine Allergy   Verified 07/20/20 19:08


 


milk Allergy   Verified 07/20/20 19:08


 


niacin Allergy   Verified 07/20/20 19:08


 


Penicillins Allergy  Rash Verified 07/20/20 19:08


 


Sulfa (Sulfonamide Allergy  Rash Verified 07/20/20 19:08





Antibiotics)     


 


aspirin AdvReac   Verified 07/20/20 19:08


 


ICE CREAM Allergy Mild  Uncoded 07/20/20 19:08


 


paper tape Allergy Mild Rash Uncoded 07/20/20 19:08


 


PORK Allergy Mild  Uncoded 07/20/20 19:08











Visit Medications: 


 Current Medications





Acetaminophen (Tylenol)  650 mg PO Q4H PRN


   PRN Reason: Headache/Fever/Mild Pain (1-3)


   Last Admin: 07/23/20 07:11 Dose:  650 mg


Hydrocodone Bitart/Acetaminophen (Norco 5/325)  1 tab PO Q4H PRN


   PRN Reason: Moderate Pain (4-6)


Hydrocodone Bitart/Acetaminophen (Norco 7.5/325)  1 tab PO Q4H PRN


   PRN Reason: Severe Pain (7-10)


Albuterol/Ipratropium (Duoneb)  3 ml NEB [id number] PRN


   PRN Reason: SOB &/or Wheezing


Amlodipine Besylate (Norvasc)  5 mg PO DAILY [facility]


   Last Admin: 07/23/20 12:17 Dose:  5 mg


Atorvastatin Calcium (Lipitor)  20 mg PO HS [facility]


   Last Admin: 07/22/20 21:19 Dose:  20 mg


Benzonatate (Tessalon)  100 mg PO Q4H PRN


   PRN Reason: Cough


Diphenhydramine HCl (Benadryl)  25 mg PO Q6H PRN


   PRN Reason: Itching & Insomnia


Docusate Sodium (Colace)  100 mg PO BIDPRN PRN


   PRN Reason: Constipation


Dronedarone (Multaq)  400 mg PO BID-WM [facility]


   Last Admin: 07/23/20 11:08 Dose:  Not Given


Duloxetine HCl (Cymbalta)  30 mg PO HS [facility]


   Last Admin: 07/22/20 21:19 Dose:  30 mg


Fluticasone Propionate (Flonase Nasal Spray)  1 gm NASAL DAILY [facility]


   Last Admin: 07/23/20 12:17 Dose:  1 spr


Hydrochlorothiazide (Hydrochlorothiazide)  12.5 mg PO DAILY [facility]


   Last Admin: 07/23/20 12:12 Dose:  12.5 mg


Labetalol HCl (Normodyne)  10 mg SLOW IVP Q4H PRN


   PRN Reason: SBP Greater Than 180


Levothyroxine Sodium (Synthroid)  88 mcg PO 0600 [facility]


   Last Admin: 07/23/20 06:19 Dose:  88 mcg


Lidocaine (Lidoderm 5% Patch)  1 patch TD Q12HR [facility]


Loperamide HCl (Imodium)  2 mg PO PRN PRN


   PRN Reason: Diarrhea/Loose Stools


Melatonin (Melatonin)  3 mg PO HS PRN


   PRN Reason: Insomnia


   Last Admin: 07/22/20 21:37 Dose:  3 mg


Multivitamins (Theragran)  1 tab PO DAILY [facility]


   Last Admin: 07/23/20 12:17 Dose:  1 tab


Nitrofurantoin Macrocrystals (Macrobid)  100 mg PO BID [facility]


   Last Admin: 07/23/20 12:16 Dose:  100 mg


Ondansetron HCl (Zofran)  4 mg IVP Q6H PRN


   PRN Reason: Nausea/Vomiting


Pantoprazole Sodium (Protonix)  40 mg PO DAILY [facility]


   Last Admin: 07/23/20 12:18 Dose:  40 mg


Polyethylene Glycol (Miralax)  17 gm PO [facility]


   Last Admin: 07/22/20 21:21 Dose:  Not Given


Senna/Docusate Sodium (Senokot S)  1 tab PO BID PRN


   PRN Reason: Constipation


Sodium Chloride (Flush - Normal Saline)  10 ml IVF Q12HR [facility]


   Last Admin: 07/23/20 12:18 Dose:  10 ml


Sodium Chloride (Flush - Normal Saline)  10 ml IVF PRN PRN


   PRN Reason: Saline Flush








Vital Signs & Weight: 


 Vital Signs











  Temp Pulse Resp BP BP Pulse Ox


 


 07/23/20 15:45  97.6 F  57 L  18   140/66  94 L


 


 07/23/20 12:10   55 L  18   124/68  97


 


 07/23/20 11:12  97.5 F L  71  18  170/90 H   96


 


 07/23/20 07:10  97.3 F L  52 L  20   152/67 H  95








 











Weight                         224 lb 3.2 oz

















- Physical Exam


General: alert & oriented x3


HEENT: mucus membranes moist


Neck: supple neck


Cardiac: regular rate and rhythm, S1/S2


Lungs: clear to auscultation


Neuro: cranial nerve 2-12 intact


Extremities: no edema





- Labs


Result Diagrams: 


 07/21/20 03:52





 07/21/20 03:52


 Troponin/CKMB











Troponin I  0.232 ng/mL (< 0.028)  H  07/21/20  06:33    














- Telemetry


Sinus rhythms and dysrhythmias: sinus rhythm





- Assessment/Plan


Assessment/Plan: 





1. Afib with RVR - Converted back to SR around 1200 on 07/22/2020; may need GI 

consult to start OAC or ASA only due to hx of GI bleed; On Multaq, Metoprolol 

12.5mg BID and Diltiazem, which decreased to 60mg BID due to bradycardia; 

Stress test result is still pending at this time; If there is an abnormality 

then she will need a cardiac cath and probably a pacemaker.


2. UTI - on ABX,


3. HTN - stable with current med


4. HLD - on Statin


5. anemia 


6. hx of DVT/PE - not on OAC or ASA for now due to low Hgb


7. Hypothyroidism


8. Hx of GI bleed


MAR reviewed





* Echo on 07/21/2020 with EF 60-65%, mild LVH, mod LAE, mild MR and TR





Pt. seen and eval. by me. I agree with the A/P by the NP. She had the first day 

of a 2 day protocol stress test today, the second day will be tomorrow. She is 

maintaining NSR and denies c/o's. Will d/c betablocker and see if the HR will 

remain stable. [place]

## 2020-07-24 NOTE — DIS
DATE OF ADMISSION:  07/20/2020



DATE OF DISCHARGE:  07/24/2020



REASON FOR HOSPITALIZATION:  Atrial fibrillation with rapid ventricular response.



PROCEDURES PERFORMED AND TREATMENTS RENDERED:  Ms. Coates is a very pleasant

80-year-old female who presented to [facility] on 07/20/2020, with atrial

fibrillation with rapid ventricular response.  Please see full history and physical

and consultation note for details.  The patient initially presenting for only vague

type symptoms including generalized weakness and "muscle spasm" as she called it in

her heart.  The patient also was found to have acute urinary tract infection.  The

patient was admitted to the intermediate medical care floor with a Cardizem drip for

rate control.  The patient has a history of massive GI bleeding and has been on

Eliquis therapy in the past.  Eliquis caused such significant bleeding for this

patient, that she has been stopped on this medication and recommended to never take

it again.  The patient was started on IV antibiotics and adjusted according to

culture and sensitivity for her urinary tract infection.  Please see full

consultation notes and progress notes from Cardiology for full details.  Medications

were adjusted appropriately throughout her hospitalization, and she was found to

have converted into sinus rhythm.  Cardiology recommending nuclear medicine stress

test, this was interpreted on 07/24/2020, as no reversible ischemia.  The patient

was recommended safe for discharge by Cardiology with close followup in the

outpatient setting.  The patient was recommended to follow up with primary care

physician and Cardiology in the next 1 to 2 weeks. 



CONDITION ON DISCHARGE:  Stable.



SPECIFIC INSTRUCTIONS FOR THE PATIENT/FAMILY:  

1. The patient was recommended to take all medications as directed, these were sent

to her preferred pharmacy for her convenience. 

2. The patient was recommended to follow up with primary care physician in the next

5 to 7 days. 

3. The patient was recommended to follow up with Cardiology in the next 1 to 2 weeks.

4. The patient was recommended to follow up with Gastroenterology in the next 3 to 4

weeks to discuss the necessity of anticoagulation if atrial fibrillation were to

return. 

5. The patient was recommended to return to acute care hospital immediately if signs

or symptoms return, worsen, or any other new symptoms occur. 



DISCHARGE MEDICATIONS:  Please see full discharge medication list for details with

the following new medications. 

1. Multaq 400 mg one tablet p.o. b.i.d.

2. Nitrofurantoin 100 mg one tablet p.o. b.i.d.

3. Diltiazem 60 mg one tablet p.o. b.i.d.

4. All other home medications continued without changes.



TIME SPENT:  Greater than 37 minutes spent coordinating care and discharge process

for this patient. 







Job ID:  [id number]

## 2020-07-24 NOTE — NM
CARDIAC SPECT:

 

HISTORY: 

An 80-year-old female with new-onset atrial fibrillation, chest pain, hypertension, dyslipidemia, DVT
, PE.

 

TECHNIQUE: 

A myocardial perfusion scan is performed using the single-isotope 2-day protocol with 13 mCi Techneti
um 99m sestamibi injected intravenously for rest and stress images.  Pharmacologic stress with LexiSc
an was monitored and interpreted by Dr. Liu.

 

FINDINGS: 

No fixed or reversible defects are seen.  

 

GATED SPECT LVEF:

53%.

 

WALL MOTION EXAM:

No segmental or wall motion abnormalities are seen.

 

IMPRESSION: 

No evidence of reversible ischemia.

 

POS: AH

## 2020-07-24 NOTE — PDOC.CPN
- Subjective


Date: 07/24/20


Time: 08:30


Interval history: 





The pt seen and examined.  No overnight events.  No cardiac complaints.





- Objective


Allergies/Adverse Reactions: 


 Allergies











Allergy/AdvReac Type Severity Reaction Status Date / Time


 


phenazopyridine HCl Allergy Severe Rash Verified 07/20/20 19:08





[From Azo]     


 


adhesive Allergy   Verified 07/20/20 19:08


 


amoxicillin Allergy  Nausea Verified 07/20/20 19:08


 


cephalexin monohydrate Allergy  Rash Verified 07/20/20 19:08





[From Keflex]     


 


Cephalosporins Allergy   Verified 07/20/20 19:08


 


codeine Allergy   Verified 07/20/20 19:08


 


milk Allergy   Verified 07/20/20 19:08


 


niacin Allergy   Verified 07/20/20 19:08


 


Penicillins Allergy  Rash Verified 07/20/20 19:08


 


Sulfa (Sulfonamide Allergy  Rash Verified 07/20/20 19:08





Antibiotics)     


 


aspirin AdvReac   Verified 07/20/20 19:08


 


ICE CREAM Allergy Mild  Uncoded 07/20/20 19:08


 


paper tape Allergy Mild Rash Uncoded 07/20/20 19:08


 


PORK Allergy Mild  Uncoded 07/20/20 19:08











Visit Medications: 


 Current Medications





Acetaminophen (Tylenol)  650 mg PO Q4H PRN


   PRN Reason: Headache/Fever/Mild Pain (1-3)


   Last Admin: 07/23/20 07:11 Dose:  650 mg


Hydrocodone Bitart/Acetaminophen (Norco 5/325)  1 tab PO Q4H PRN


   PRN Reason: Moderate Pain (4-6)


Hydrocodone Bitart/Acetaminophen (Norco 7.5/325)  1 tab PO Q4H PRN


   PRN Reason: Severe Pain (7-10)


Albuterol/Ipratropium (Duoneb)  3 ml NEB [id number] PRN


   PRN Reason: SOB &/or Wheezing


Amlodipine Besylate (Norvasc)  5 mg PO DAILY [facility]


   Last Admin: 07/23/20 12:17 Dose:  5 mg


Atorvastatin Calcium (Lipitor)  20 mg PO HS [facility]


   Last Admin: 07/23/20 21:40 Dose:  20 mg


Benzonatate (Tessalon)  100 mg PO Q4H PRN


   PRN Reason: Cough


Diltiazem HCl (Cardizem Sr)  60 mg PO BID [facility]


   Last Admin: 07/23/20 21:40 Dose:  Not Given


Diphenhydramine HCl (Benadryl)  25 mg PO Q6H PRN


   PRN Reason: Itching & Insomnia


   Last Admin: 07/24/20 01:24 Dose:  25 mg


Docusate Sodium (Colace)  100 mg PO BIDPRN PRN


   PRN Reason: Constipation


Dronedarone (Multaq)  400 mg PO BID-[facility]


   Last Admin: 07/24/20 13:08 Dose:  Not Given


Duloxetine HCl (Cymbalta)  30 mg PO HS [facility]


   Last Admin: 07/23/20 21:41 Dose:  30 mg


Fluticasone Propionate (Flonase Nasal Spray)  1 gm NASAL DAILY [facility]


   Last Admin: 07/23/20 12:17 Dose:  1 spr


Hydrochlorothiazide (Hydrochlorothiazide)  12.5 mg PO DAILY [facility]


   Last Admin: 07/23/20 12:12 Dose:  12.5 mg


Labetalol HCl (Normodyne)  10 mg SLOW IVP Q4H PRN


   PRN Reason: SBP Greater Than 180


Levothyroxine Sodium (Synthroid)  88 mcg PO 0600 [facility]


   Last Admin: 07/24/20 05:49 Dose:  88 mcg


Lidocaine (Lidoderm 5% Patch)  1 patch TD 1200 [facility]


Loperamide HCl (Imodium)  2 mg PO PRN PRN


   PRN Reason: Diarrhea/Loose Stools


Melatonin (Melatonin)  3 mg PO HS PRN


   PRN Reason: Insomnia


   Last Admin: 07/23/20 21:41 Dose:  3 mg


Miscellaneous Medication (Lidocaine Patch Removal)  1 each TOP 2359 [facility]


Multivitamins (Theragran)  1 tab PO DAILY [facility]


   Last Admin: 07/23/20 12:17 Dose:  1 tab


Nitrofurantoin Macrocrystals (Macrobid)  100 mg PO BID [facility]


   Last Admin: 07/23/20 21:41 Dose:  100 mg


Ondansetron HCl (Zofran)  4 mg IVP Q6H PRN


   PRN Reason: Nausea/Vomiting


Pantoprazole Sodium (Protonix)  40 mg PO DAILY [facility]


   Last Admin: 07/23/20 12:18 Dose:  40 mg


Polyethylene Glycol (Miralax)  17 gm PO HS [facility]


   Last Admin: 07/23/20 21:42 Dose:  Not Given


Senna/Docusate Sodium (Senokot S)  1 tab PO BID PRN


   PRN Reason: Constipation


Sodium Chloride (Flush - Normal Saline)  10 ml IVF Q12HR [facility]


   Last Admin: 07/23/20 21:41 Dose:  10 ml


Sodium Chloride (Flush - Normal Saline)  10 ml IVF PRN PRN


   PRN Reason: Saline Flush








Vital Signs & Weight: 


 Vital Signs











  Temp Pulse Resp BP Pulse Ox


 


 07/24/20 08:00  97.8 F  55 L  16  171/74 H  97


 


 07/24/20 02:58  97.8 F  56 L  16  147/72 H  95








 











Weight                         223 lb 12.8 oz

















- Physical Exam


General: alert & oriented x3


HEENT: mucus membranes moist


Neck: supple neck


Cardiac: regular rate and rhythm, S1/S2


Lungs: clear to auscultation, decreased breath sounds


Neuro: cranial nerve 2-12 intact


Extremities: no edema





- Labs


Result Diagrams: 


 07/21/20 03:52





 07/21/20 03:52


 Troponin/CKMB











Troponin I  0.232 ng/mL (< 0.028)  H  07/21/20  06:33    














- Telemetry


Sinus rhythms and dysrhythmias: sinus bradycardia (< 50 bpm)





- Assessment/Plan


Assessment/Plan: 





1. Afib with RVR - Converted back to SR around 1200 on 07/22/2020; may need GI 

consult to start OAC or ASA only due to hx of GI bleed; On Multaq and Diltiazem 

60m BID; Metoprolol 12.5mg BID was d/[place] yesterday due to Bradycardia; Stress 

test result today showed no ischemia; .


2. UTI - on ABX,


3. HTN - stable with current med


4. HLD - on Statin


5. anemia 


6. hx of DVT/PE - not on OAC or ASA for now due to low Hgb


7. Hypothyroidism


8. Hx of GI bleed


MAR reviewed





* Echo on 07/21/2020 with EF 60-65%, mild LVH, mod LAE, mild MR and TR


* Stress test result on 07/24/2020 showed no ischemia;





Pt. seen and eval. I agree with the A/P by the NP. She is maintaining NSR. No 

evidence of ischemia on the stress test. Due to her history of GI bleeding 

which was likely due to her diverticulitis she is not a good candidate for OAC. 

She will monitor the HR. The atrial fib. was likely caused by the stress of the 

UTI and the recent death of her . The HR is stable on the present meds. 

She is safe to d/c to home. If she returns to atrial fib. then I would suggest 

a GI consult to see if she would be a candidate for short term OAC and advise a 

watchman or larriat device and then hopefully be able to stop the OAC. I will 

see her in the office in a coupkle weeks. I have informed her to check her 

puklse or have her family do that and let me know if it becomes irregular. [email or web address] 09-Sep-2019

## 2020-08-01 NOTE — RAD
EXAM: Single view of the chest



HISTORY:   Confusion after fall



COMPARISON: 7/20/2020



FINDINGS: Single view of the chest shows a normal sized cardiomediastinal silhouette. There is no karen
dence of consolidation, mass, or pleural effusion. Hardware is seen in the left humerus.



IMPRESSION: No evidence of acute cardiopulmonary disease



Reported By: Raman Watkins 

Electronically Signed:  8/1/2020 9:10 PM

## 2020-08-01 NOTE — CT
EXAM: CT brain without contrast



HISTORY: Confusion after fall



COMPARISON: None



TECHNIQUE: Multiple contiguous axial images were obtained and a CT of the brain without contrast. Sag
ittal and coronal reformats were performed.



FINDINGS: There are scattered hypodensities in the subcortical and periventricular white matter consi
stent with small vessel ischemic disease. Diffuse cerebral atrophy is seen. There is no evidence of

hydrocephalus, intracranial hemorrhage, or extra-axial fluid collection.



There is a mass along the left parietal scalp. The calvarium is unremarkable. The visualized paranasa
l sinuses and mastoid air cells are well aerated.



IMPRESSION: No evidence of acute intracranial abnormality



Reported By: Raman Watkins 

Electronically Signed:  8/1/2020 8:50 PM

## 2020-08-01 NOTE — RAD
Exam: Single view of the pelvis



HISTORY: Pelvic and hip pain after fall



COMPARISON: None



FINDINGS: A single view the pelvis shows no evidence of acute fracture or dislocation. No degenerativ
e changes seen in either hip.



IMPRESSION: No evidence of acute osseous abnormality.



Reported By: Raman Watkins 

Electronically Signed:  8/1/2020 8:59 PM

## 2020-08-01 NOTE — RAD
EXAM: 2 views of the left hip



HISTORY: Left hip pain after fall



COMPARISON: None



FINDINGS: 2 views of the left hip shows no evidence of acute fracture or dislocation. No degenerative
 changes are seen. No soft tissue swelling is present.



IMPRESSION: No evidence of acute osseous abnormality.



Reported By: Raman Watkins 

Electronically Signed:  8/1/2020 9:03 PM

## 2021-01-08 NOTE — RAD
XR Chest 1 View Portable



HISTORY: Chest pain



COMPARISON: 8/1/2020



FINDINGS: The heart size is at upper limits of normal. The aorta is tortuous. The lungs are well expa
nded without focal areas of consolidation, pneumothorax or pleural effusions. There are postop

changes in the left proximal humerus



IMPRESSION: No radiographic evidence of acute cardiopulmonary process.



Reported By: Norberto Martin 

Electronically Signed:  1/8/2021 10:36 AM

## 2021-01-23 NOTE — CT
CT ABDOMEN AND PELVIS:

 

Date:  01/23/2021

 

PROVIDED CLINICAL HISTORY:   

Left lower quadrant pain. 

 

FINDINGS:

 

Comparison with 02/15/2020. 

 

A moderate hiatal hernia is redemonstrated. The visualized lung bases are free of significant opacity
. 

 

There is a stable hypodensity involving the left hepatic lobe compatible with cyst. The solid abdomin
al organs are suboptimally evaluated in the absence of IV contrast material that demonstrate a stable
 unenhanced CT appearance, also including bilateral adrenal adenomas. 

 

There is conspicuous sigmoid colonic diverticulosis. There is focal mural thickening and surrounding 
fat stranding involving the proximal sigmoid colon. There is no evidence for extraluminal gas or a fo
clark fluid collection. There is no evidence for bowel obstruction, additional fat stranding, free intr
aperitoneal fluid, or free intraperitoneal air. 

 

Changes of prior cholecystectomy are seen. There is no evidence for appendicitis. 

 

Vascular calcifications are noted. 

 

The osseous structures demonstrate no concerning lytic or blastic lesion. Remote compression deformit
y involving L2. 

 

IMPRESSION: 

Findings compatible with uncomplicated sigmoid diverticulitis. 

 

 

POS: JOSEFA

## 2022-07-20 NOTE — CON
DATE OF CONSULTATION:  09/12/2018

 

GENERAL SURGERY CONSULTATION

 

CHIEF COMPLAINT:  Rectal bleeding.

 

HISTORY OF PRESENT ILLNESS:  This is a 78-year-old female who has a history of DVT and PE, who has be
en on Eliquis.  She has had multiple episodes of bright red blood per rectum and anemia.  She has had
 three colonoscopies and they have not been able to locate a source other than hemorrhoids.  The bloo
d is always fresh bright red blood usually after bowel movements.  Apparently, she has had hemorrhoid
 surgery in the past.

 

PAST MEDICAL HISTORY:  Hyperlipidemia, breast cancer, hypothyroidism, DVTs, PEs, anemia, diverticulos
is and diverticulitis.

 

PAST SURGICAL HISTORY:  Thyroidectomy, mastectomies, appendectomy, cholecystectomy, hysterectomy, lef
t shoulder surgery, skin cancer removal.  She has anxiety and depression.

 

SOCIAL HISTORY:  She is .  She quit smoking well back.  No alcohol.

 

ALLERGIES:  Include AMOXICILLIN, CEPHALEXIN, NONSTEROIDALS, PENICILLIN, SULFA.

 

MEDICATIONS:  Sertraline, losartan, atorvastatin, hydrochlorothiazide, Eliquis, levothyroxine, tramad
ol, multivitamins.

 

PHYSICAL EXAMINATION:

VITAL SIGNS:  Temperature 97.6, pulse 62, blood pressure 136/38.  She is a morbidly obese female in n
o apparent distress.

HEENT:  Unremarkable.

LUNGS:  Clear.

HEART:  Regular rate and rhythm.

ABDOMEN:  Has been morbidly obese and soft, nontender.

RECTAL EXAM:  She has quite a bit of external component of hemorrhoids.  No obvious source from exter
nal exam.  Digital rectal exam, no palpable masses.

 

LABORATORY DATA:  Her hemoglobin 7.9 and 24 after a unit of blood from 7.3-22.  Electrolytes are fine
.  Elevated glucose of 113, BUN of 28, CO2 of 21.

 

ASSESSMENT:  Probable hemorrhoidal bleed.

 

PLAN:  We went ahead and inserted some Gelfoam in the anal canal.  We are going to go ahead and trans
fuse her.  It has been 2 days since she stopped the Eliquis, so it should be reversing fairly quickly
.  She will probably need hemorrhoid surgery.  We will keep her n.p.o.
DATE OF CONSULTATION:  09/12/2018

 

REASON FOR CONSULTATION:  Lower gastrointestinal bleeding.

 

HISTORY OF PRESENT ILLNESS:  Ms. Coates is a pleasant 78-year-old female seen by myself and Dr. Anahi Zhang in the outpatient setting for chronic iron deficiency anemia for several years now.  She was n
oted to have a drop in hemoglobin back in 2015 from a baseline of 13 to 7.2 after a ventral hernia re
pair and after being placed on anticoagulation for DVT in the left lower extremity.  She has had some
 issues with PE over that time and her current cause is she is now on chronic anticoagulation.  She h
ad a screening colonoscopy in 2007, which was normal except for sigmoid stricture from diverticulosis
.  She had an EGD for reflux in 2012, which showed a hiatal hernia.  Subsequent to that, when she bec
darío severely anemic, she had an EGD and colonoscopy which were normal except for the hiatal hernia an
d diverticulosis in 06/2015 and then with a recurrent presentation in 12/2016, she had a normal EGD. 
 _____  probably related to chronic blood loss from Eliquis.  An outpatient capsule endoscopy was per
formed, which was scheduled that time and I read that when Dr. Zhang was out, and that was essentially
 normal except for some lymphangiectasia, but no evidence of vascular ectasias or bleeding sites or a
ctive bleeding.  At that time, she was sent to Hematology for chronic iron infusion that she could no
t maintain a hemoglobin with oral iron.  The patient's daughter states that she has been running in t
he range of anywhere from around 8-10, but then she had dropped to 6.9 in July.  The patient is admit
gabrielle now because of lower GI bleeding.  She states that she started having bleeding on Monday with a f
ormed stool with some blood dripping, it was initially bright red and now it is darker.  She has had 
some small clots.  _____ .  She thinks it may be a hemorrhoid.  She actually went to the ER twice yes
terday and had a hemoglobin of 7.9 at 1300 hours.  Then, she returned and at 2100 hours, it was 7.9 a
gain.  Her hemoglobin on 08/28/2018 had been 8.4, but on 07/03/2018, it had been 7.7.  Also, she came
 back and was admitted and had a hemoglobin of 7.3 this morning and 7.9 at 9:00 this morning.  She ha
s no rectal pain.  She feels the blood just kind of comes out her.  She denies any upper abdominal pa
in.  She denies using any NSAIDs.  She was given some rectal cream in the ER, but has not used it.

 

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
nsion; heart murmur; history of DVTs and PE; microcytic anemia in the past with documented iron defic
iency, felt to be related to her large hiatal hernia and continued use of chronic anticoagulation; hi
story of breast cancer with previous mastectomy; history of anxiety; history of depression; history o
f chronic anticoagulation.

 

PAST SURGICAL HISTORY:  Thyroidectomy, endoscopies as noted above, bilateral mastectomy, appendectomy
, cholecystectomy, hysterectomy, left shoulder surgery, left ankle surgery, abdominal hernia repair.

 

FAMILY HISTORY:  Breast cancer, hyperlipidemia, hypertension.

 

SOCIAL HISTORY:  The patient is a former smoker, does not smoke for more than 10 years.  Does not dri
nk alcohol or use drugs.

 

REVIEW OF SYSTEMS:  She denies using NSAIDs.  She denies dysphagia, odynophagia, melena, abdominal pa
in, or rectal pain.

 

ALLERGIES:  PHENAZOPYRIDINE, AMOXICILLIN, ASPIRIN, CEPHALEXIN, PENICILLIN, SULFA, and PAPER TAPE.

 

MEDICATIONS AT HOME:  Tramadol, sertraline, Theragran-M, losartan, levothyroxine, hydrocortisone, sera
rvastatin.  Eliquis 5 mg, decreased to 2.5 recently, she has not taken this since Monday.

 

PRESENT MEDICATIONS:  Zofran, Tylenol, normal saline.

 

PHYSICAL EXAMINATION:

GENERAL:  She is morbidly overweight.

VITAL SIGNS:  Temperature is 98, pulse 60, blood pressure 112/48.

LUNGS:  Clear.

HEART:  Regular rate and rhythm, without clicks, rubs or murmurs.

ABDOMEN:  Soft, nontender, without palpable hepatosplenomegaly.

EXTREMITIES:  Reveal no clubbing, cyanosis, or edema.

RECTAL:  Revealed some internal hemorrhoids, it seems to be _____  the hemorrhoids as well, but a red
 punctate spot that may have been bleeding.  There is no overt thrombosis.  There are no fissures or 
tears.  On digital exam, there is brown stool inside the vault with no clot.

 

LABORATORY DATA:  As per HPI.  _____  white count was 11.1, MCV 84, platelet count 229.  INR 1.2.  So
dium 141, potassium 3.8, BUN and creatinine are 28 and 1.0, this is at her baseline.  Ferritin was 6.
0 on 08/28/2018.  Liver function tests normal.  B12 was 411 on 04/2017.

 

ASSESSMENT:  History of chronic iron deficiency anemia felt likely related to chronic anticoagulation
.  She has had upper and lower endoscopies in 2015 and 2016 and then a capsule endoscopy last year wi
th no findings.  She had no acute bleeding at any of those episodes and it was felt that probably her
 large hiatal hernia in conjunction with anticoagulation lead to her anemia.  Now, she has what appea
rs to be rectal outlet bleeding with brown stool involved and just blood in the perianal area _____ h
emorrhoids.  This is probably exacerbated by her anticoagulation.  There have been no signs of hemody
namic instability.

 

RECOMMENDATIONS:

1.  Topical cream for hemorrhoids.

2.  Surgical consultation.

3.  Observation.

4.  Hold Eliquis.  If patient shows overt signs of hemorrhage, we can proceed with _____  bleeding sc
an, but at this time, we will hold off on that.  We would go ahead and place her on a PPI as she does
 have a large hiatal hernia _____  I would do that chronically, but I do not think that she has any s
igns of upper gastrointestinal bleeding at this time.
DATE OF CONSULTATION:  09/12/2018

 

SERVICE:  Pulmonary Medicine.

 

REASON FOR CONSULTATION:  ICU patient.

 

HISTORY OF PRESENT ILLNESS:  The patient is a 78-year-old white female with 
past medical history significant for hemorrhoids.  She was in her usual state 
of health when she started having bright red blood per rectum.  She denies any 
current chest pain, nausea, vomiting, fevers or chills.  Her appetite is good.  
She is actually a little upset about being n.p.o.  She does not recall having 
significant watery diarrhea.  Otherwise, there has been no interval change to 
her condition.  She has a history of near bed bound status.  She also has a 
history of DVTs and pulmonary embolisms for which she is on blood thinning 
medications.  GI has evaluated the patient.  They were considering doing a 
scanned red blood cell scan.  That being said, because of her description of 
the bleeding, GI believe this could be hemorrhoidal and has called a surgeon.  
Pulmonary Critical Care will be following along for the duration of the 
hospital stay.  Otherwise, the patient is in her usual state of health and she 
has no specific complaints.

 

PAST MEDICAL HISTORY:

1.  Hypertension.

2.  Dyslipidemia.

3.  Thromboembolic disease including DVT and PEs.

4.  Diverticulosis.

5.  Iron deficiency anemia.

6.  Heart murmur.

7.  History of breast cancer.

 

PAST SURGICAL HISTORY:

1.  Thyroidectomy.

2.  Bilateral mastectomy.

3.  Appendectomy.

4.  Cholecystectomy.

5.  Hysterectomy.

6.  Left shoulder surgery.

7.  Left ankle surgery.

8.  Excision of skin cancer.

 

SOCIAL HISTORY:  Negative for alcohol, tobacco or illicit drug use currently.  
She has got a greater than 40-pack-year history of smoking.  She did quit 
smoking over 10 years ago, however.  She denies any alcohol or illicit drugs.  
She has no exposure to chemicals, dust, asbestos or tuberculosis.

 

FAMILY HISTORY:  Noncontributory.

 

ALLERGIES:  AMOXICILLIN, CEPHALEXIN, NSAID, PENICILLIN, PHENAZOPYRIDINE.

 

MEDICATIONS:  List of her inpatient medications were reviewed.  No specific 
updates were made at this time.

 

REVIEW OF SYSTEMS:  General, head, ears, eyes, nose, throat, cardiovascular, 
respiratory, GI, , musculoskeletal, neurologic and skin is negative except as 
mentioned in the HPI.

 

PHYSICAL EXAMINATION:

VITAL SIGNS:  Afebrile, pulse 62, blood pressure 136/38, respirations 20, 
saturation 96% on room air.

GENERAL:  The patient is awake and alert in no apparent distress.

LUNGS:  Excellent air entry with no prolonged expiratory phase or wheezing 
present.

HEART:  Normal rate, regular.

ABDOMEN:  Soft.  Minimal dependent tenderness to palpation is present in the 
left lower quadrant without rebound or guarding.  Bowel sounds are active.

GENITOURINARY:  No [place].

NEUROLOGIC:  Grossly nonfocal.

MUSCULOSKELETAL:  No cyanosis or clubbing.  There is no pitting in the 
bilateral lower extremities.

 

LABORATORY DATA:  Hemoglobin 7.9 after 1 unit of blood with near appropriate 
rise.  WBC 11.1, platelets 229,000.  Creatinine 1.10.  Basic metabolic profile 
is otherwise unremarkable.

 

ASSESSMENT:

1.  Acute blood loss anemia.

2.  Lower gastrointestinal bleed, suspecting hemorrhoidal lesions.

3.  History of deep venous thrombosis/pulmonary embolus, previously on blood 
thinners.

 

DISCUSSION AND PLAN:  Surgical consultation is currently pending.  Pulmonary 
Critical Care will continue to follow along in this location.  We will trend 
hemoglobins through time and keep her hemoglobin above 7.  Obviously, if she 
has significant bloody output and develops any instability, we will change our 
strategy.  Pulmonary Critical Care will continue to follow along for the time 
being.

 

70 minutes have been devoted to this patient in various activities.  I 
personally reviewed all imaging studies and laboratory data noted within this 
document.  For fifty percent of this time, I was interacting with the patient 
at the bedside or coordinating care with the care team.  For the remainder of 
the time I was immediately available to the patient in the hospital unit.  



ARIA
independent